# Patient Record
Sex: FEMALE | Race: WHITE | NOT HISPANIC OR LATINO | Employment: FULL TIME | URBAN - METROPOLITAN AREA
[De-identification: names, ages, dates, MRNs, and addresses within clinical notes are randomized per-mention and may not be internally consistent; named-entity substitution may affect disease eponyms.]

---

## 2022-12-08 ENCOUNTER — OFFICE VISIT (OUTPATIENT)
Dept: CARDIOLOGY CLINIC | Facility: CLINIC | Age: 22
End: 2022-12-08

## 2022-12-08 VITALS
WEIGHT: 135.8 LBS | SYSTOLIC BLOOD PRESSURE: 100 MMHG | DIASTOLIC BLOOD PRESSURE: 72 MMHG | HEART RATE: 80 BPM | HEIGHT: 63 IN | BODY MASS INDEX: 24.06 KG/M2

## 2022-12-08 DIAGNOSIS — T78.40XS ALLERGY, SEQUELA: ICD-10-CM

## 2022-12-08 DIAGNOSIS — G90.9 AUTONOMIC DYSFUNCTION: ICD-10-CM

## 2022-12-08 DIAGNOSIS — E86.1 HYPOTENSION DUE TO HYPOVOLEMIA: ICD-10-CM

## 2022-12-08 DIAGNOSIS — R00.2 PALPITATIONS: Primary | ICD-10-CM

## 2022-12-08 DIAGNOSIS — G90.A POTS (POSTURAL ORTHOSTATIC TACHYCARDIA SYNDROME): ICD-10-CM

## 2022-12-08 DIAGNOSIS — I95.89 HYPOTENSION DUE TO HYPOVOLEMIA: ICD-10-CM

## 2022-12-08 RX ORDER — LEVOCETIRIZINE DIHYDROCHLORIDE 5 MG/1
TABLET, FILM COATED ORAL
COMMUNITY

## 2022-12-08 RX ORDER — MIDODRINE HYDROCHLORIDE 2.5 MG/1
2.5 TABLET ORAL 3 TIMES DAILY
Qty: 90 TABLET | Refills: 3 | Status: SHIPPED | OUTPATIENT
Start: 2022-12-08

## 2022-12-08 NOTE — PROGRESS NOTES
EPS Consultation/New Patient Evaluation - Yeimy Alcaraz 25 y o  female MRN: [id number]       Referring:self    CC/HPI:   It was a pleasure to see Hemal Welsh in our arrhythmia clinic at [place] [address]  As you know she is a 25 y o  woman with presumed diagnosis of POTS, significant food and seasonal allergy on daily Xyzal, who presents for evaluation and management of her symptoms  Patient reports having dizziness since age 15 after having suffered multiple sinus infection  She was seen by cardiologist and had normal ECG, and echocardiogram  At the time her brother had been diagnosed with PANDAS syndrome  She continued to have symptoms including low blood pressure to systolic of 70 mmHg  She reportedly has had syncopal episode about 4 years ago  She was evaluated at [facility] in 2020 for symptoms of dizziness, chest palpitation and racing heart rate when standing up  Per the office note, she has been carrying +/- diagnosis of POTS by orthostatic testing and by tilt-testing  She had tried florinef few years before consultation but did not work and caused hormonal problems  Per the office visit note, she had MRIs, EEG, tilt-tests, ECHO,s leep study which had been normal  She was recommended to wear waist high RAYA stocking, increase salt intake, and increase hydration  She has now obtained job in the area as an   She presents today as she had episode of loss of her peripheral vision when standing up along with dizziness, and palpitations  She does wear RAYA stockings though only when doing exercise and it helps her feel better  However she is not wearing them daily as it causes numbness in the legs  She drinks about two 36 oz bottles of water and 2 cups of tea with caffine as well 3 cups of tea without caffeine  She also reports having significant food allergies - including raw vegetables and fruit except lime, and nuts   She is also taking many supplemental vitamins  She has been using Xyzal daily per the request of her immunologist      She also states her glucose level do not regulate well  She had fasting glucose study in the hospital during which she was told her glucose level does not remain stable, and it can be high as well as low  She was advised to eat small frequent meals  Orthostatics performed in the office revealed -  Supine - 96/74 mmHg, heart rate 79  Sitting - 90/62 mmHg, heart rate 82  Standing 100/70, heart rate 115       Past Medical History:  History reviewed  No pertinent past medical history  Medications:      Current Outpatient Medications:   •  levocetirizine (XYZAL) 5 MG tablet, , Disp: , Rfl:      History reviewed  No pertinent family history  Social History     Socioeconomic History   • Marital status: Single     Spouse name: Not on file   • Number of children: Not on file   • Years of education: Not on file   • Highest education level: Not on file   Occupational History   • Not on file   Tobacco Use   • Smoking status: Never   • Smokeless tobacco: Never   Substance and Sexual Activity   • Alcohol use: Yes     Comment: social   • Drug use: Never   • Sexual activity: Not on file   Other Topics Concern   • Not on file   Social History Narrative   • Not on file     Social Determinants of Health     Financial Resource Strain: Not on file   Food Insecurity: Not on file   Transportation Needs: Not on file   Physical Activity: Not on file   Stress: Not on file   Social Connections: Not on file   Intimate Partner Violence: Not on file   Housing Stability: Not on file     Social History     Tobacco Use   Smoking Status Never   Smokeless Tobacco Never     Social History     Substance and Sexual Activity   Alcohol Use Yes    Comment: social       Review of Systems   Constitutional: Negative for chills and fever  HENT: Negative  Eyes: Positive for visual disturbance  Negative for blurred vision and double vision     Cardiovascular: Positive for near-syncope and palpitations  Negative for chest pain, dyspnea on exertion, leg swelling, orthopnea, paroxysmal nocturnal dyspnea and syncope  Respiratory: Negative for cough and sputum production  Endocrine: Negative  Skin: Negative  Negative for rash  Musculoskeletal: Negative  Negative for arthritis and joint pain  Gastrointestinal: Negative for abdominal pain, nausea and vomiting  Genitourinary: Negative  Neurological: Positive for disturbances in coordination (perception/balance issues), dizziness, light-headedness and numbness (in fingers when waking up each mornig)  Psychiatric/Behavioral: Negative  The patient is not nervous/anxious  Allergic/Immunologic: Positive for environmental allergies (and food allergies)  Objective:     Vitals: Blood pressure 100/72, pulse 80, height 5' 3" (1 6 m), weight 61 6 kg (135 lb 12 8 oz)  , Body mass index is 24 06 kg/m²  ,        Physical Exam:    GEN: Jacinda Bailey appears well, alert and oriented x 3, pleasant and cooperative   HEENT: pupils equal, round, and reactive to light; extraocular muscles intact  NECK: supple, no carotid bruits   HEART: regular rhythm, normal S1 and S2, no murmurs, clicks, gallops or rubs   LUNGS: clear to auscultation bilaterally; no wheezes, rales, or rhonchi   ABDOMEN: normal bowel sounds, soft, no tenderness, no distention  EXTREMITIES: peripheral pulses normal; no clubbing, cyanosis, or edema  NEURO: no focal findings   SKIN: normal without suspicious lesions on exposed skin      Labs & Results:  Below is the patient's most recent value for Albumin, ALT, AST, BUN, Calcium, Chloride, Cholesterol, CO2, Creatinine, GFR, Glucose, HDL, Hematocrit, Hemoglobin, Hemoglobin A1C, LDL, Magnesium, Phosphorus, Platelets, Potassium, PSA, Sodium, Triglycerides, and WBC     No results found for: ALT, AST, BUN, CALCIUM, CL, CHOL, CO2, CREATININE, GFRAA, GFRNONAA, HDL, HCT, HGB, HGBA1C, LDL, MG, PHOS, PLT, K, PSA, NA, TRIG, WBC  Note: for a comprehensive list of the patient's lab results, access the Results Review activity  Cardiac testing:     I personally reviewed the ECG performed in the clinic on 12/08/22  It reveals sinus rhythm at 80 bpm      Echocardiograms:  No results found for this or any previous visit  No results found for this or any previous visit  Catheterizations:   No results found for this or any previous visit  Stress Tests:  No results found for this or any previous visit  Holter monitor -   No results found for this or any previous visit  No results found for this or any previous visit  ASSESSMENT/PLAN:  1  POTS  Patient appears to show symptoms of POTS   Tilt-table test record is not is available for review however orthostatic performed in the office are suggestive of POTS  She does benefit from thigh high compression stockings though causes numbness  Advised patient to try knee high compression stocking   Increase hydration with electrolytes, up to 2 L  Avoid caffeine and alcohol  Increase salt intake  Xyzal is known to cause hypotension however patient states it is the only medication working for her numerous food and seasonal allergies  Requested patient to discuss with her immunologist who is in [place]  Will start midodrine 2 5 mg tid after discussing options with immunologist  Obtain BP cuff and daily record BP  Refer to PT for POTS training exercises     2   ?Autonomic disorder  Patient reports having dizziness, low blood pressure after multiple sinus infections  Brother has been diagnosed with PANDAS (Pediatric Autoimmune Neuropsychiatric Disorders Associated with Streptococcal Infections)  Patient reportedly has difficulty regulating glucose and blood pressure  Also notes having perception problem   May have an autonomic disorder  Will refer patient to neurologist for further evaluation     Follow-up in 3 months     I have spent 60 minutes with Patient  today in which greater than 50% of this time was spent in counseling/coordination of care regarding Diagnostic results, Intructions for management, Importance of tx compliance and Impressions

## 2023-01-10 ENCOUNTER — EVALUATION (OUTPATIENT)
Dept: PHYSICAL THERAPY | Facility: CLINIC | Age: 23
End: 2023-01-10

## 2023-01-10 DIAGNOSIS — G90.A POTS (POSTURAL ORTHOSTATIC TACHYCARDIA SYNDROME): ICD-10-CM

## 2023-01-10 DIAGNOSIS — R00.2 PALPITATIONS: ICD-10-CM

## 2023-01-10 DIAGNOSIS — I95.89 HYPOTENSION DUE TO HYPOVOLEMIA: ICD-10-CM

## 2023-01-10 DIAGNOSIS — E86.1 HYPOTENSION DUE TO HYPOVOLEMIA: ICD-10-CM

## 2023-01-10 NOTE — PROGRESS NOTES
PT Evaluation     Today's date: 1/10/2023  Patient name: Lynnette Wu  : 2000  MRN: [id number]  Referring provider: Nacho Adair MD  Dx:   Encounter Diagnosis     ICD-10-CM    1  Palpitations  R00 2 Ambulatory referral to Physical Therapy      2  Hypotension due to hypovolemia  I95 89 Ambulatory referral to Physical Therapy    E86 1       3  POTS (postural orthostatic tachycardia syndrome)  G90  A Ambulatory referral to Physical Therapy                     Assessment  Assessment details: Patient reports to physical therapy with a diagnosis of POTS as well as visual perceptual disorientation  At this time patient has poor tolerance for exercise and knowledge of best exercise for symptoms, decreased balance with significant difficulty without use of visual system for balance causing her to have several instances of falls and difficulty with low light environments and with poor visual references  Patient will benefit from skilled therapy intervention at 2x/week for 8 weeks in order to improve exercise tolerance, reduce POTS symptoms, improve balance, reduce symptoms of dizziness and return patient to increased functional abilities     Understanding of Dx/Px/POC: good   Prognosis: good    Goals  STG:  Pt will be able to stand statically with her eyes closed on the floor for 30 sec within 4 weeks  Pt will be able to exercise without symptoms within 4 weeks  Pt will be independent with home exercise program within 4 weeks    LTG  Pt will be able to self progress through exercise program for POTS within 8 weeks without symptoms  Pt will report low disability index on DHI for balance within 8 weeks  Pt will be independent and consistent with HEP for balance within 8 weeks    Plan  Plan details: Suggestion is twice a week but may only be able to do 1 day per week due to copay as well as schedule conflicts  Patient would benefit from: skilled physical therapy  Planned therapy interventions: neuromuscular re-education, balance, balance/weight bearing training, home exercise program, therapeutic exercise, therapeutic activities and gait training  Frequency: 2x week  Duration in weeks: 8  Plan of Care beginning date: 1/10/2023  Treatment plan discussed with: patient        Subjective Evaluation    History of Present Illness  Mechanism of injury: Pt has a diagnosis of pots which makes it dififcult for her to exercise  Separate from [address] she has visual spatial processing dysfunction which she has had for years  She was in physical therapy previously to improve her proprioceptive training  She reports having difficulty with navigating when not seeing where her feet are, running into objects, and maintaining her balance  She also has hypoglycemia and low blood pressure at times     Exercise history: works out several days a week doing exercises which is mostly kick boxing and supine or staying at some level strengthening exercise          Objective       Co Morbidities:    Syncope:Yes    Current Recommendations:  Compression Socks:Yes  Fluid intake:increasd  Salt intake: increased  Beta Blocker:No    Symptoms with Exercise:  Ligthheaded: Yes  Chest Discomffort: No  Mental clouding: Yes  Headache:Yes  Nausea: Yes  Blurred or Tunnel Vision:Yes    Strength: Grossly 4/5 thorughout    MCTSIB:   Condition 1: 30 sec  Condition 2: 3 sec  Condition 3: 14 sec  Condition 4: not trialed      Starting Protocol for Day One:  Training Mode:    Month: Week:  Warm up: min          RPE:       HR:  Base Pace: min       RPE:       HR:  Recovery: min          RPE:         HR  Base pace:  Min        RPE:        HR:  Cool Down:    Mine   RPE:         Hr:       Precautions: fall risk    Short Term Goal Expiration Date:(2/10/23)  Long Term Goal Expiration Date: (3/10/23)  POC Expiration Date: (3/10/23)            Manuals 1/20                                       Neuro Re-Ed         HT Seated  10x 2       Standing EC 1 finger hold wall - 30 sec Ther Ex                                                                        Ther Activity                        Gait Training                        Modalities

## 2023-01-17 ENCOUNTER — OFFICE VISIT (OUTPATIENT)
Dept: PHYSICAL THERAPY | Facility: CLINIC | Age: 23
End: 2023-01-17

## 2023-01-17 DIAGNOSIS — I95.89 HYPOTENSION DUE TO HYPOVOLEMIA: ICD-10-CM

## 2023-01-17 DIAGNOSIS — E86.1 HYPOTENSION DUE TO HYPOVOLEMIA: ICD-10-CM

## 2023-01-17 DIAGNOSIS — R00.2 PALPITATIONS: Primary | ICD-10-CM

## 2023-01-17 DIAGNOSIS — G90.A POTS (POSTURAL ORTHOSTATIC TACHYCARDIA SYNDROME): ICD-10-CM

## 2023-01-20 NOTE — PROGRESS NOTES
Daily Note     Today's date: 2023  Patient name: Enrique Kelly  : 2000  MRN: [id number]  Referring provider: Deshawn Alex MD  Dx:   Encounter Diagnosis     ICD-10-CM    1  Palpitations  R00 2       2  Hypotension due to hypovolemia  I95 89     E86 1       3  POTS (postural orthostatic tachycardia syndrome)  G90  A                      Subjective: Patient reports no new changes since last session  Has been workin mignon exercises at home      Objective: See treatment diary below      Assessment: Attempted to start on bike then trnsition to treadmill walking, without 2 minutes on treadmill at RPE of 4 pt began having an increase in ches tpain  Attempted completin glonger time on recumbent bike which she was able to tolerate  Discussed with patient that she likely at this time is unable to exert herself to that level in an upright position without increasing her pots symptoms  Will need to work to figure out how to build her up to upright inreased RPE slower  Attempted some strengthening exercises which had similar outcomes  Did have noted improveent with upright seated balance with addition of exeternal cues for proprioceptive awareness of neck in supine position first        Plan: Continue per plan of care        Precautions: fall risk    Short Term Goal Expiration Date:(2/10/23)  Long Term Goal Expiration Date: (3/10/23)  POC Expiration Date: (3/10/23)           Manuals 17                                      Neuro Re-Ed         HT Seated  10x 2       Standing EC 1 finger hold wall - 30 sec        JPE  20x supine    20x seated                                      Ther Ex        10 min total bike  RPE 3-4 no resistance          treadmill  3 min 2 2 mph      bridges  [id number]      downard dog  2x                                      Ther Activity                        Gait Training                        Modalities

## 2023-01-24 ENCOUNTER — OFFICE VISIT (OUTPATIENT)
Dept: PHYSICAL THERAPY | Facility: CLINIC | Age: 23
End: 2023-01-24

## 2023-01-24 DIAGNOSIS — R00.2 PALPITATIONS: Primary | ICD-10-CM

## 2023-01-24 DIAGNOSIS — E86.1 HYPOTENSION DUE TO HYPOVOLEMIA: ICD-10-CM

## 2023-01-24 DIAGNOSIS — G90.A POTS (POSTURAL ORTHOSTATIC TACHYCARDIA SYNDROME): ICD-10-CM

## 2023-01-24 DIAGNOSIS — I95.89 HYPOTENSION DUE TO HYPOVOLEMIA: ICD-10-CM

## 2023-01-24 NOTE — PROGRESS NOTES
Daily Note     Today's date: 2023  Patient name: Elizabeth Templeton  : 2000  MRN: [id number]  Referring provider: Ricarda Soto MD  Dx:   Encounter Diagnosis     ICD-10-CM    1  Palpitations  R00 2       2  Hypotension due to hypovolemia  I95 89     E86 1       3  POTS (postural orthostatic tachycardia syndrome)  G90  A                      Subjective:       Objective: See treatment diary below      Assessment:     Plan: Continue per plan of care        Precautions: fall risk    Short Term Goal Expiration Date:(2/10/23)  Long Term Goal Expiration Date: (3/10/23)  POC Expiration Date: (3/10/23)           Manuals 17                                      Neuro Re-Ed         HT Seated  10x 2       Standing EC 1 finger hold wall - 30 sec        JPE  20x supine    20x seated                                      Ther Ex        10 min total bike  RPE 3-4 no resistance          treadmill  3 min 2 2 mph      bridges  [id number]      downard dog  2x                                      Ther Activity                        Gait Training                        Modalities

## 2023-01-31 ENCOUNTER — OFFICE VISIT (OUTPATIENT)
Dept: PHYSICAL THERAPY | Facility: CLINIC | Age: 23
End: 2023-01-31

## 2023-01-31 DIAGNOSIS — G90.A POTS (POSTURAL ORTHOSTATIC TACHYCARDIA SYNDROME): Primary | ICD-10-CM

## 2023-01-31 DIAGNOSIS — E86.1 HYPOTENSION DUE TO HYPOVOLEMIA: ICD-10-CM

## 2023-01-31 DIAGNOSIS — I95.89 HYPOTENSION DUE TO HYPOVOLEMIA: ICD-10-CM

## 2023-01-31 DIAGNOSIS — R00.2 PALPITATIONS: ICD-10-CM

## 2023-02-01 NOTE — PROGRESS NOTES
Daily Note     Today's date: 2023  Patient name: No Montoya  : 2000  MRN: [id number]  Referring provider: Guerline Pena MD  Dx:   Encounter Diagnosis     ICD-10-CM    1  POTS (postural orthostatic tachycardia syndrome)  G90  A       2  Hypotension due to hypovolemia  I95 89     E86 1       3  Palpitations  R00 2                      Subjective: Patient reports to physical therapy this session stating that she hasn't been able to work out as much as she wanted  Attempted using an upright bike which caused her to have more symptoms than she had on a recumbent bike  Objective: See treatment diary below      Assessment: Therapist encouraged pt to attempt to find a recumbent bike to use and then if she was unable to return to the upright bike and continue as she is able to consistently  Focused this session more on visual spatial awareness with attempts to reweight to somatosensory system for feedback  Utilized weight shifting, JPE for cervical spine  Pt was able to hold balance with eyes closed on the floor  Plan: Continue per plan of care        Precautions: fall risk    Short Term Goal Expiration Date:(2/10/23)  Long Term Goal Expiration Date: (3/10/23)  POC Expiration Date: (3/10/23)           Manuals 17                                      Neuro Re-Ed         HT Seated  10x 2       Standing EC 1 finger hold wall - 30 sec        JPE  20x supine    20x seated                                      Ther Ex        10 min total bike  RPE 3-4 no resistance          treadmill  3 min 2 2 mph      bridges  [id number]      downard dog  2x                                      Ther Activity                        Gait Training                        Modalities

## 2023-02-07 ENCOUNTER — OFFICE VISIT (OUTPATIENT)
Dept: PHYSICAL THERAPY | Facility: CLINIC | Age: 23
End: 2023-02-07

## 2023-02-07 DIAGNOSIS — I95.89 HYPOTENSION DUE TO HYPOVOLEMIA: ICD-10-CM

## 2023-02-07 DIAGNOSIS — E86.1 HYPOTENSION DUE TO HYPOVOLEMIA: ICD-10-CM

## 2023-02-07 DIAGNOSIS — G90.A POTS (POSTURAL ORTHOSTATIC TACHYCARDIA SYNDROME): Primary | ICD-10-CM

## 2023-02-10 NOTE — PROGRESS NOTES
Daily Note     Today's date: 2/10/2023  Patient name: Sujata Rod  : 2000  MRN: [id number]  Referring provider: Nataly Cates MD  Dx:   Encounter Diagnosis     ICD-10-CM    1  POTS (postural orthostatic tachycardia syndrome)  G90  A       2  Hypotension due to hypovolemia  I95 89     E86 1                      Subjective:       Objective: See treatment diary below      Assessment:       Plan: Continue per plan of care        Precautions: fall risk    Short Term Goal Expiration Date:(2/10/23)  Long Term Goal Expiration Date: (3/10/23)  POC Expiration Date: (3/10/23)           Manuals 17                                      Neuro Re-Ed         HT Seated  10x 2       Standing EC 1 finger hold wall - 30 sec        JPE  20x supine    20x seated                                      Ther Ex        10 min total bike  RPE 3-4 no resistance          treadmill  3 min 2 2 mph      bridges  [id number]      downard dog  2x                                      Ther Activity                        Gait Training                        Modalities

## 2023-02-14 ENCOUNTER — OFFICE VISIT (OUTPATIENT)
Dept: PHYSICAL THERAPY | Facility: CLINIC | Age: 23
End: 2023-02-14

## 2023-02-14 DIAGNOSIS — E86.1 HYPOTENSION DUE TO HYPOVOLEMIA: ICD-10-CM

## 2023-02-14 DIAGNOSIS — I95.89 HYPOTENSION DUE TO HYPOVOLEMIA: ICD-10-CM

## 2023-02-14 DIAGNOSIS — G90.A POTS (POSTURAL ORTHOSTATIC TACHYCARDIA SYNDROME): Primary | ICD-10-CM

## 2023-02-14 DIAGNOSIS — R00.2 PALPITATIONS: ICD-10-CM

## 2023-02-16 NOTE — PROGRESS NOTES
Daily Note     Today's date: 2023  Patient name: Eva Coombs  : 2000  MRN: [id number]  Referring provider: Rickie Tyson MD  Dx:   Encounter Diagnosis     ICD-10-CM    1  POTS (postural orthostatic tachycardia syndrome)  G90  A       2  Hypotension due to hypovolemia  I95 89     E86 1       3  Palpitations  R00 2                      Subjective: Pt did several workouts this week and did have fewer symptoms overall  Objective: See treatment diary below      Assessment: patient was able to complete eyes close don pball well this session after repetitions of practice it did improve  She also was able to complete tandem walking fo rsometimes 3 steps without light touch on //bars  Plan to continue to practice again at next session  Plan: Continue per plan of care        Precautions: fall risk    Short Term Goal Expiration Date:(2/10/23)  Long Term Goal Expiration Date: (3/10/23)  POC Expiration Date: (3/10/23)           Manuals                                      Neuro Re-Ed         HT Seated  10x 2       Standing EC 1 finger hold wall - 30 sec        JPE  20x supine    20x seated      Seated march   pball - 40x    EC - 5 sets of 10     Bird dog    EC 10x     Tandem wal   3 laps EO    5 laps EC min UE              Ther Ex        10 min total bike  RPE 3-4 no resistance          treadmill  3 min 2 2 mph      bridges  [id number]      downard dog  2x                                      Ther Activity                        Gait Training                        Modalities

## 2023-02-21 ENCOUNTER — OFFICE VISIT (OUTPATIENT)
Dept: PHYSICAL THERAPY | Facility: CLINIC | Age: 23
End: 2023-02-21

## 2023-02-21 DIAGNOSIS — G90.A POTS (POSTURAL ORTHOSTATIC TACHYCARDIA SYNDROME): Primary | ICD-10-CM

## 2023-02-21 DIAGNOSIS — I95.89 HYPOTENSION DUE TO HYPOVOLEMIA: ICD-10-CM

## 2023-02-21 DIAGNOSIS — R00.2 PALPITATIONS: ICD-10-CM

## 2023-02-21 DIAGNOSIS — E86.1 HYPOTENSION DUE TO HYPOVOLEMIA: ICD-10-CM

## 2023-02-24 NOTE — PROGRESS NOTES
Daily Note     Today's date: 2023  Patient name: Jacinda Bailey  : 2000  MRN: [id number]  Referring provider: Jemima Barber MD  Dx:   Encounter Diagnosis     ICD-10-CM    1  POTS (postural orthostatic tachycardia syndrome)  G90  A       2  Hypotension due to hypovolemia  I95 89     E86 1       3  Palpitations  R00 2                      Subjective: Did several workouts this week and is getting better at not triggering feelings  Overall noticing she can bending and grab something form the floor and have fewer symptoms  Objective: See treatment diary below      Assessment: Pt was able to do sveeral reptitions of walking with eyes closed  Attempted utilization of a wegihted vest to increase sensory input  Pt overall significantly improving with maintaining balance with resistance  Plan: Continue per plan of care        Precautions: fall risk    Short Term Goal Expiration Date:(2/10/23)  Long Term Goal Expiration Date: (3/10/23)  POC Expiration Date: (3/10/23)           Manuals                                     Neuro Re-Ed         HT Seated  10x 2       Standing EC 1 finger hold wall - 30 sec    30 sec x 5    Fwd walking 5laps ec    JPE  20x supine    20x seated      Seated march   pball - 40x    EC - 5 sets of 10     Bird dog    EC 10x     Tandem wal   3 laps EO    5 laps EC min UE  3 laps //bars with weighted vest            Ther Ex        10 min total bike  RPE 3-4 no resistance      10 min    treadmill  3 min 2 2 mph      bridges  [id number]      downard dog  2x                                      Ther Activity                        Gait Training                        Modalities

## 2023-02-28 ENCOUNTER — OFFICE VISIT (OUTPATIENT)
Dept: PHYSICAL THERAPY | Facility: CLINIC | Age: 23
End: 2023-02-28

## 2023-02-28 DIAGNOSIS — E86.1 HYPOTENSION DUE TO HYPOVOLEMIA: ICD-10-CM

## 2023-02-28 DIAGNOSIS — R00.2 PALPITATIONS: ICD-10-CM

## 2023-02-28 DIAGNOSIS — G90.A POTS (POSTURAL ORTHOSTATIC TACHYCARDIA SYNDROME): Primary | ICD-10-CM

## 2023-02-28 DIAGNOSIS — I95.89 HYPOTENSION DUE TO HYPOVOLEMIA: ICD-10-CM

## 2023-03-01 NOTE — PROGRESS NOTES
Daily Note     Today's date: 3/1/2023  Patient name: Kiarra Miller  : 2000  MRN: [id number]  Referring provider: Mami Sepulveda MD  Dx:   Encounter Diagnosis     ICD-10-CM    1  POTS (postural orthostatic tachycardia syndrome)  G90  A       2  Hypotension due to hypovolemia  I95 89     E86 1       3  Palpitations  R00 2                      Subjective: Patient reports doing several day sof exercise but feels she over did it with bikig one day and then felt poorly the next day with pots symptoms  Objective: See treatment diary below      Assessment:   Patient was bale to walk down the hallway without physical assist and without UE assist with her eyes closed  She required a few physical cues to redirect her path but she did not fall  She required time to reorient while being monitored but then could return  Plan: Continue per plan of care        Precautions: fall risk    Short Term Goal Expiration Date:(2/10/23)  Long Term Goal Expiration Date: (3/10/23)  POC Expiration Date: (3/10/23)           Manuals                                    Neuro Re-Ed         HT Seated  10x 2       Standing EC 1 finger hold wall - 30 sec    30 sec x 5    Fwd walking 5laps ec Walking hallway ec - 40 ft x 2    Walking halway ec with finger tip on wall - 40 ft x 2   JPE  20x supine    20x seated      Seated march   pball - 40x    EC - 5 sets of 10     Bird dog    EC 10x     Tandem wal   3 laps EO    5 laps EC min UE  3 laps //bars with weighted vest Ht/hn 4 laps //bars   [employer] without looking at fet up and over stairs - 15x   Ther Ex        10 min total bike  RPE 3-4 no resistance      10 min    treadmill  3 min 2 2 mph      bridges  [id number]      downard dog  2x                                      Ther Activity                        Gait Training                        Modalities

## 2023-03-07 ENCOUNTER — APPOINTMENT (OUTPATIENT)
Dept: PHYSICAL THERAPY | Facility: CLINIC | Age: 23
End: 2023-03-07

## 2023-03-07 ENCOUNTER — OFFICE VISIT (OUTPATIENT)
Dept: PHYSICAL THERAPY | Facility: CLINIC | Age: 23
End: 2023-03-07

## 2023-03-07 DIAGNOSIS — R00.2 PALPITATIONS: ICD-10-CM

## 2023-03-07 DIAGNOSIS — G90.A POTS (POSTURAL ORTHOSTATIC TACHYCARDIA SYNDROME): Primary | ICD-10-CM

## 2023-03-07 DIAGNOSIS — I95.89 HYPOTENSION DUE TO HYPOVOLEMIA: ICD-10-CM

## 2023-03-07 DIAGNOSIS — E86.1 HYPOTENSION DUE TO HYPOVOLEMIA: ICD-10-CM

## 2023-03-07 NOTE — PROGRESS NOTES
PT Re-Evaluation     Today's date: 3/7/2023  Patient name: Fletcher Schwartz  : 2000  MRN: [id number]  Referring provider: Ricki Crump MD  Dx:   Encounter Diagnosis     ICD-10-CM    1  POTS (postural orthostatic tachycardia syndrome)  G90  A       2  Hypotension due to hypovolemia  I95 89     E86 1       3  Palpitations  R00 2                      Assessment/Plan  Assessment  Pt presents to OP PT for PN/RE following 8 visits of skilled therapy intervention  During this time pt has completed interventions targeting cardiovascular endurance, balance, visual spatial and kinesthetic awareness  Per pt report, she is now able to better /moderate her activity level to avoid POTS symptoms and is exercising more  Still notes difficulty w/ spatial and kinesthetic awareness  Upon formal re-assessment pt demonstrates significant improvements in mCTSIB Conditions 2 &3 with varying levels of postural sway; unable to maintain Condition 4 for >2 sec and severe postural sway observed  Continues w/ difficulty w/ proprioceptive awareness when given dual task  Given these findings pt is recommended for continued skilled therapy intervention 1-2x week  Pt agreeable to POC         Goals  STG:  Pt will be able to stand statically with her eyes closed on the floor for 30 sec within 4 weeks  MET  Pt will be able to exercise without symptoms within 4 weeks  ONGOING  Pt will be independent with home exercise program within 4 weeks  MET    LTG  Pt will be able to self progress through exercise program for POTS within 8 weeks without symptoms  ONGOING  Pt will report low disability index on DHI for balance within 8 weeks  ONGOING  Pt will be independent and consistent with HEP for balance within 8 weeks  ONGOING    Plan  Plan details: Suggestion is twice a week but may only be able to do 1 day per week due to copay as well as schedule conflicts  Patient would benefit from: skilled physical therapy  Planned therapy interventions: neuromuscular re-education, balance, balance/weight bearing training, home exercise program, therapeutic exercise, therapeutic activities and gait training  Frequency: 2x week  Duration in weeks: 8  Treatment plan discussed with: patient    Subjective    Pt reports improvements overall since initiating therapy  States that she is better at moderating amount of activity to avoid flare of symptoms  She is also better at knowing how to correct her balance and recognizing what might be difficult  She still has difficulty w/ proprioceptive tasks and visual spatial awareness       Objective     Strength: Grossly 4/5 thorughout    MCTSIB Number of Seconds (IE) Number of Seconds (RE 3/7)   Feet Together, Eyes Open 30 30   Feet Together, Eyes Closed 3 30 (max sway)   Feet Together, Eyes Open Foam 14 30 (min-mod sway)   Feet Together, Eyes Closed Foam NT 2      DHI: 56/100 (severe handicap)       Precautions: fall risk    Short Term Goal Expiration Date:(2/10/23)  Long Term Goal Expiration Date: (3/10/23)  POC Expiration Date: (5/2/23)           Manuals 3/7 1/17 2/14 2/24 2/28                                   Neuro Re-Ed         HT Seated         Standing EC    30 sec x 5    Fwd walking 5laps ec Walking hallway ec - 40 ft x 2    Walking halway ec with finger tip on wall - 40 ft x 2   JPE  20x supine    20x seated      Seated march   pball - 40x    EC - 5 sets of 10     Bird dog    EC 10x     Tandem walk EO x2 laps // bars   EC x2 laps // bars  3 laps EO    5 laps EC min UE  3 laps //bars with weighted vest Ht/hn 4 laps //bars   Stand on Bosu w/ alt UE lift x30       Bosu lunges w/ HT/HN x20 ea (alt  LE)       Standing on foam kicking soccer ball 2x30       Laundry basket Walking down all w/ HT and avoiding objects on ground x3 laps    Carry without looking at fet up and over stairs - 15x   Ther Ex        10 min total bike 10 min  RPE 3-4 no resistance      10 min    treadmill  3 min 2 2 mph      bridges [id number]      downard dog  2x      RE VR                               Ther Activity                        Gait Training                        Modalities

## 2023-03-14 ENCOUNTER — OFFICE VISIT (OUTPATIENT)
Dept: PHYSICAL THERAPY | Facility: CLINIC | Age: 23
End: 2023-03-14

## 2023-03-14 DIAGNOSIS — R42 DIZZINESS: ICD-10-CM

## 2023-03-14 DIAGNOSIS — Z71.82 EXERCISE COUNSELING: ICD-10-CM

## 2023-03-14 DIAGNOSIS — I95.89 HYPOTENSION DUE TO HYPOVOLEMIA: ICD-10-CM

## 2023-03-14 DIAGNOSIS — G90.A POTS (POSTURAL ORTHOSTATIC TACHYCARDIA SYNDROME): Primary | ICD-10-CM

## 2023-03-14 DIAGNOSIS — E86.1 HYPOTENSION DUE TO HYPOVOLEMIA: ICD-10-CM

## 2023-03-17 NOTE — PROGRESS NOTES
Daily Note     Today's date: 3/17/2023  Patient name: Kya Lizarraga  : 2000  MRN: [id number]  Referring provider: All Sawant MD  Dx:   Encounter Diagnosis     ICD-10-CM    1  POTS (postural orthostatic tachycardia syndrome)  G90  A       2  Hypotension due to hypovolemia  I95 89     E86 1       3  Exercise counseling  Z71 82       4  Dizziness  R42                      Subjective: Patient reports not doing exercises this week and overall having more POTS symptoms this week compared to how she has been feeling previously  Objective: See treatment diary below      Assessment: Pt was able to complete lateral weight shifting with eyes closed for 2-3 reps before needing to use UE to regain balance  Plan: Continue per plan of care        Precautions: fall risk    Short Term Goal Expiration Date:(2/10/23)  Long Term Goal Expiration Date: (3/10/23)  POC Expiration Date: (23)           Manuals 3/7 3/14   2/28                                   Neuro Re-Ed         Rocker board  10x fwd/lat EC attempted ea      Standing EC     Walking hallway ec - 40 ft x 2    Walking halway ec with finger tip on wall - 40 ft x 2   JPE        Seated march        Bird dog   10x HT EC      Tandem walk EO x2 laps // bars   EC x2 laps // bars    Ht/hn 4 laps //bars   Stand on Abran Schein w/ alt UE lift x30       Bosu lunges w/ HT/HN x20 ea (alt  LE)       Standing on foam kicking soccer ball 2x30       Laundry basket Walking down all w/ HT and avoiding objects on ground x3 laps    Carry without looking at fet up and over stairs - 15x   Ther Ex        10 min total bike 10 min        treadmill        bridges        downard dog        RE VR                               Ther Activity                        Gait Training                        Modalities

## 2023-03-21 ENCOUNTER — OFFICE VISIT (OUTPATIENT)
Dept: PHYSICAL THERAPY | Facility: CLINIC | Age: 23
End: 2023-03-21

## 2023-03-21 DIAGNOSIS — G90.A POTS (POSTURAL ORTHOSTATIC TACHYCARDIA SYNDROME): Primary | ICD-10-CM

## 2023-03-21 DIAGNOSIS — Z71.82 EXERCISE COUNSELING: ICD-10-CM

## 2023-03-24 NOTE — PROGRESS NOTES
Daily Note     Today's date: 3/24/2023  Patient name: Silas Stewart  : 2000  MRN: [id number]  Referring provider: Loren Hines MD  Dx:   Encounter Diagnosis     ICD-10-CM    1  POTS (postural orthostatic tachycardia syndrome)  G90  A       2  Exercise counseling  Z71 82                      Subjective: patient reports to physical therapy this date with reports of increased POTS symptoms this week and having difficulty completing exercises at home due to symptoms      Objective: See treatment diary below      Assessment: Trialed occluders for blocking vision of feet b/l  Started with small balance challenge and added more challenge  She improved with practice  Also added random attempts to step over objects and respond which she was able to do without loss of balance  Plan: Continue per plan of care        Precautions: fall risk    Short Term Goal Expiration Date:(2/10/23)  Long Term Goal Expiration Date: (3/10/23)  POC Expiration Date: (23)           Manuals 3/7 3/14 3/21  2/28                                   Neuro Re-Ed         Rocker board  10x fwd/lat EC attempted ea      Standing EC     Walking hallway ec - 40 ft x 2    Walking halway ec with finger tip on wall - 40 ft x 2   JPE        Seated march        Bird dog   10x HT EC      Tandem walk EO x2 laps // bars   EC x2 laps // bars    Ht/hn 4 laps //bars   Stand on Abran Schein w/ alt UE lift x30       Bosu lunges w/ HT/HN x20 ea (alt  LE)       Standing on foam kicking soccer ball 2x30       Laundry basket Walking down all w/ HT and avoiding objects on ground x3 laps  With occluders - 4 laps hallway     With occluders and one pool noodle - 4 laps  Carry without looking at fet up and over stairs - 15x   EC   Walking with 1 pool noodle suport - 2 laps    No pool noodle - 2 laps     Turn and grab   Scarves with EC - 30x with random cog tasks                             Ther Ex        10 min total bike 10 min        treadmill        bridges jesse dog        RE VR                               Ther Activity                        Gait Training                        Modalities

## 2023-03-28 ENCOUNTER — OFFICE VISIT (OUTPATIENT)
Dept: PHYSICAL THERAPY | Facility: CLINIC | Age: 23
End: 2023-03-28

## 2023-03-28 DIAGNOSIS — R00.2 PALPITATIONS: ICD-10-CM

## 2023-03-28 DIAGNOSIS — G90.A POTS (POSTURAL ORTHOSTATIC TACHYCARDIA SYNDROME): Primary | ICD-10-CM

## 2023-03-28 DIAGNOSIS — Z71.82 EXERCISE COUNSELING: ICD-10-CM

## 2023-03-28 DIAGNOSIS — I95.89 HYPOTENSION DUE TO HYPOVOLEMIA: ICD-10-CM

## 2023-03-28 DIAGNOSIS — E86.1 HYPOTENSION DUE TO HYPOVOLEMIA: ICD-10-CM

## 2023-03-29 ENCOUNTER — APPOINTMENT (OUTPATIENT)
Dept: PHYSICAL THERAPY | Facility: CLINIC | Age: 23
End: 2023-03-29

## 2023-03-30 NOTE — PROGRESS NOTES
Daily Note     Today's date: 3/30/2023  Patient name: Tha Meraz  : 2000  MRN: [id number]  Referring provider: Sol Ruggiero MD  Dx:   Encounter Diagnosis     ICD-10-CM    1  POTS (postural orthostatic tachycardia syndrome)  G90  A       2  Exercise counseling  Z71 82       3  Hypotension due to hypovolemia  I95 89     E86 1       4  Palpitations  R00 2                      Subjective:Fell off of the couch and hit her head last week and was diagnosed with a concussion  Had 2 days off of work and then returned one day virtually and two days in person  Feeling significant headache, significantly fatigued and foggy by 6th hour of work  No loss of consciousness, weakness, loss of sensation, neck pain  Some dizziness reported  NO vomitting or nausea  Does report some increase in symptoms with exertion outside of normal symptoms relating to POTS  Objective: See treatment diary below      Assessment: THerpaist suggested seeing PCP for note to reduce work day for 1-2 weeks, suggested returnign to recumbent bike to exercise without increasing symptoms  Assured pt symptoms will improve and typical recovery time is about 2 weeks and she is still well within that window of time  Hel don activity this date due to significant headache currently at 7/10  Plan: Continue per plan of care        Precautions: fall risk    Short Term Goal Expiration Date:(2/10/23)  Long Term Goal Expiration Date: (3/10/23)  POC Expiration Date: (23)           Manuals 3/7 3/14 3/21  2/28                                   Neuro Re-Ed         Rocker board  10x fwd/lat EC attempted ea      Standing EC     Walking hallway ec - 40 ft x 2    Walking halway ec with finger tip on wall - 40 ft x 2   JPE        Seated march        Bird dog   10x HT EC      Tandem walk EO x2 laps // bars   EC x2 laps // bars    Ht/hn 4 laps //bars   Stand on Abran Terrazas w/ alt UE lift x30       Bosu lunges w/ HT/HN x20 ea (alt  LE)       Standing on foam kicking soccer ball 2x30       Laundry basket Walking down all w/ HT and avoiding objects on ground x3 laps  With occluders - 4 laps hallway     With occluders and one pool noodle - 4 laps  Carry without looking at fet up and over stairs - 15x   EC   Walking with 1 pool noodle suport - 2 laps    No pool noodle - 2 laps     Turn and grab   Scarves with EC - 30x with random cog tasks                             Ther Ex        10 min total bike 10 min        treadmill        bridges        downard dog        RE VR                               Ther Activity                        Gait Training                        Modalities

## 2023-04-04 ENCOUNTER — OFFICE VISIT (OUTPATIENT)
Dept: PHYSICAL THERAPY | Facility: CLINIC | Age: 23
End: 2023-04-04

## 2023-04-04 DIAGNOSIS — R42 DIZZINESS: ICD-10-CM

## 2023-04-04 DIAGNOSIS — G90.A POTS (POSTURAL ORTHOSTATIC TACHYCARDIA SYNDROME): Primary | ICD-10-CM

## 2023-04-04 DIAGNOSIS — Z71.82 EXERCISE COUNSELING: ICD-10-CM

## 2023-04-05 ENCOUNTER — APPOINTMENT (OUTPATIENT)
Dept: PHYSICAL THERAPY | Facility: CLINIC | Age: 23
End: 2023-04-05

## 2023-04-07 NOTE — PROGRESS NOTES
Daily Note     Today's date: 2023  Patient name: Yemi Pozo  : 2000  MRN: [id number]  Referring provider: Marty Brar MD  Dx:   Encounter Diagnosis     ICD-10-CM    1  POTS (postural orthostatic tachycardia syndrome)  G90  A       2  Dizziness  R42       3  Exercise counseling  Z71 82                      Subjective: Patient reports feeling significantly btter but does have a bit of a headache today and also has been a bit more symptomatic with exercise this week  Objective: See treatment diary below      Assessment: Patient was able to tolerate biking this date but with significant increase in symptoms when exerting herself more than a 4/10 RPE  Frequent need to slow down to decrease symptoms  Plan: Continue per plan of care        Precautions: fall risk    Short Term Goal Expiration Date:(2/10/23)  Long Term Goal Expiration Date: (3/10/23)  POC Expiration Date: (23)           Manuals 3/7 3/14 3/21 4/4                                    Neuro Re-Ed         Rocker board  10x fwd/lat EC attempted ea      Standing EC        JPE        Seated march        Bird dog   10x HT EC      Tandem walk EO x2 laps // bars   EC x2 laps // bars       Stand on Abran Schein w/ alt UE lift x30       Bosu lunges w/ HT/HN x20 ea (alt  LE)       Standing on foam kicking soccer ball 2x30       Laundry basket Walking down all w/ HT and avoiding objects on ground x3 laps  With occluders - 4 laps hallway     With occluders and one pool noodle - 4 laps     EC   Walking with 1 pool noodle suport - 2 laps    No pool noodle - 2 laps     Turn and grab   Scarves with EC - 30x with random cog tasks                             Ther Ex        10 min total bike 10 min    25 min lvl 1 0 varying levels of speed and intensity based on symptoms - therapist monitorig and instructing throughout    treadmill        bridges        downard dog        RE VR                               Ther Activity Gait Training                        Modalities

## 2023-04-12 ENCOUNTER — APPOINTMENT (OUTPATIENT)
Dept: PHYSICAL THERAPY | Facility: CLINIC | Age: 23
End: 2023-04-12

## 2023-04-14 NOTE — PATIENT INSTRUCTIONS
Change your water into some other drinks with electrolytes such as Gatorade    Try to decrease amount of caffeine and alcohol    Wear the RAYA stocking daily - find ones that up to the knees    Start midodrine 2 5 mg three time a day    Check blood pressure daily and keep a log     Find a primary care doctor and speak to your immunologist regarding switching Xyzal as it has side effect that can cause low blood pressure    Make an appointment with neurologist     Please see physical therapy [Yes] : Yes [No falls in past year] : Patient reported no falls in the past year [0] : 2) Feeling down, depressed, or hopeless: Not at all (0) [Never] : Never [PHQ-2 Negative - No further assessment needed] : PHQ-2 Negative - No further assessment needed [[id number]] : 0

## 2023-04-19 ENCOUNTER — APPOINTMENT (OUTPATIENT)
Dept: PHYSICAL THERAPY | Facility: CLINIC | Age: 23
End: 2023-04-19

## 2023-04-25 ENCOUNTER — OFFICE VISIT (OUTPATIENT)
Dept: PHYSICAL THERAPY | Facility: CLINIC | Age: 23
End: 2023-04-25

## 2023-04-25 DIAGNOSIS — G90.A POTS (POSTURAL ORTHOSTATIC TACHYCARDIA SYNDROME): ICD-10-CM

## 2023-04-25 DIAGNOSIS — R42 DIZZINESS: Primary | ICD-10-CM

## 2023-04-26 ENCOUNTER — OFFICE VISIT (OUTPATIENT)
Dept: FAMILY MEDICINE CLINIC | Facility: CLINIC | Age: 23
End: 2023-04-26

## 2023-04-26 VITALS
HEIGHT: 63 IN | WEIGHT: 130.8 LBS | DIASTOLIC BLOOD PRESSURE: 68 MMHG | BODY MASS INDEX: 23.18 KG/M2 | HEART RATE: 97 BPM | SYSTOLIC BLOOD PRESSURE: 108 MMHG | OXYGEN SATURATION: 97 % | TEMPERATURE: 99 F

## 2023-04-26 DIAGNOSIS — Z00.00 ANNUAL PHYSICAL EXAM: Primary | ICD-10-CM

## 2023-04-26 PROBLEM — E16.1 REACTIVE HYPOGLYCEMIA: Status: ACTIVE | Noted: 2023-04-26

## 2023-04-26 PROBLEM — E28.2 PCOS (POLYCYSTIC OVARIAN SYNDROME): Status: ACTIVE | Noted: 2023-04-26

## 2023-04-26 PROBLEM — G90.A POTS (POSTURAL ORTHOSTATIC TACHYCARDIA SYNDROME): Status: ACTIVE | Noted: 2023-04-26

## 2023-04-26 RX ORDER — ONDANSETRON 4 MG/1
TABLET, FILM COATED ORAL
COMMUNITY

## 2023-04-26 RX ORDER — LEVOCETIRIZINE DIHYDROCHLORIDE 5 MG/1
TABLET, FILM COATED ORAL
COMMUNITY

## 2023-04-27 NOTE — PROGRESS NOTES
Daily Note     Today's date: 2023  Patient name: Ricadro Calderon  : 2000  MRN: [id number]  Referring provider: Breann Malcolm MD  Dx:   Encounter Diagnosis     ICD-10-CM    1  Dizziness  R42       2  POTS (postural orthostatic tachycardia syndrome)  G90  A                      Subjective: Patient reports exercising but overall having an increase in POTS type symptoms in the last week  May be due to monthly hormonal cycles change      Objective: See treatment diary below      Assessment: Focused on supine strenghtneing program since patient overall was not feeling well this date and has not been reviewed in awhile  Pt was able to tolerate with only minimal increase in symptoms this date  Plan next session to discuss d/c        Plan: Continue per plan of care        Precautions: fall risk    Short Term Goal Expiration Date:(2/10/23)  Long Term Goal Expiration Date: (3/10/23)  POC Expiration Date: (23)           Manuals                                    Neuro Re-Ed         Rocker board        Standing EC        JPE        Seated march        Bird dog  [id number]       Tandem walk        Stand on Abran Schein w/ alt UE lift        Bosu lunges w/ HT/HN        Standing on foam kicking soccer ball        Laundry basket        EC        Turn and grab        virtualis     Eye head coordination    VOR container    VOR                   Ther Ex        10 min total bike    25 min lvl 1 0 varying levels of speed and intensity based on symptoms - therapist monitorig and instructing throughout    treadmill        bridges [id number]       plank 15 sec x 5               S/l hi abd 10x2 ea       SLR 10x2       clamshell gtb 10x 2       Ther Activity                        Gait Training                        Modalities

## 2023-04-28 NOTE — PROGRESS NOTES
Assessment/Plan:    71-year-old woman with: Annual well visit discussed very safety and health maintenance issues including healthy diet like Mediterranean diet exercise healthy weight as tolerated ample sleep stress reduction strategies discussed working return parameters at length will refer for GYN for annual exam follow-up yearly and as needed follow-up    No problem-specific Assessment & Plan notes found for this encounter  Diagnoses and all orders for this visit:    Annual physical exam  -     Ambulatory Referral to Obstetrics / Gynecology; Future    Other orders  -     levocetirizine (XYZAL) 5 MG tablet;  (Patient not taking: Reported on 4/26/2023)  -     ondansetron (ZOFRAN) 4 mg tablet; 1 tablet on the tongue and allow to dissolve (Patient not taking: Reported on 4/26/2023)          Subjective:     Chief Complaint   Patient presents with   • New Patient Visit     Establish care   • Well Check     Annual physical        Patient ID: Ezekiel Mendoza is a 21 y o  female  Patient is a 71-year-old woman who presents to establish care in this practice  She admits that she has a longstanding history of several conditions including POTS syndrome and some balance and coordination issues  She continues to follow with physical therapy she admits she is doing very well on she is also here for an annual well visit she admits being physically active generally to healthy diet she sleeps well no other health maintenance issues      The following portions of the patient's history were reviewed and updated as appropriate: allergies, current medications, past family history, past medical history, past social history, past surgical history and problem list     Review of Systems   Constitutional: Negative  HENT: Negative  Eyes: Negative  Respiratory: Negative  Cardiovascular: Negative  Gastrointestinal: Negative  Endocrine: Negative  Genitourinary: Negative  Musculoskeletal: Negative  "  Allergic/Immunologic: Negative  Neurological: Positive for dizziness  Hematological: Negative  Psychiatric/Behavioral: Negative  All other systems reviewed and are negative  Objective:      /68 (BP Location: Right arm, Patient Position: Sitting, Cuff Size: Standard)   Pulse 97   Temp 99 °F (37 2 °C) (Tympanic)   Ht 5' 3\" (1 6 m)   Wt 59 3 kg (130 lb 12 8 oz)   SpO2 97%   BMI 23 17 kg/m²          Physical Exam  Constitutional:       Appearance: She is well-developed  HENT:      Head: Atraumatic  Right Ear: External ear normal       Left Ear: External ear normal    Eyes:      Conjunctiva/sclera: Conjunctivae normal       Pupils: Pupils are equal, round, and reactive to light  Cardiovascular:      Rate and Rhythm: Normal rate and regular rhythm  Heart sounds: Normal heart sounds  Pulmonary:      Effort: Pulmonary effort is normal  No respiratory distress  Breath sounds: Normal breath sounds  Abdominal:      General: There is no distension  Palpations: Abdomen is soft  Tenderness: There is no abdominal tenderness  There is no guarding or rebound  Musculoskeletal:         General: Normal range of motion  Cervical back: Normal range of motion  Skin:     General: Skin is warm and dry  Neurological:      Mental Status: She is alert and oriented to person, place, and time  Cranial Nerves: No cranial nerve deficit  Psychiatric:         Behavior: Behavior normal          Thought Content:  Thought content normal          Judgment: Judgment normal          "

## 2023-05-02 ENCOUNTER — OFFICE VISIT (OUTPATIENT)
Dept: PHYSICAL THERAPY | Facility: CLINIC | Age: 23
End: 2023-05-02

## 2023-05-02 DIAGNOSIS — R42 DIZZINESS: Primary | ICD-10-CM

## 2023-05-02 DIAGNOSIS — G90.A POTS (POSTURAL ORTHOSTATIC TACHYCARDIA SYNDROME): ICD-10-CM

## 2023-05-05 NOTE — PROGRESS NOTES
Daily Note     Today's date: 2023  Patient name: Suri Doshi  : 2000  MRN: [id number]  Referring provider: Herminio Virk MD  Dx:   Encounter Diagnosis     ICD-10-CM    1  Dizziness  R42       2  POTS (postural orthostatic tachycardia syndrome)  G90  A                      Subjective: Patient reports to therapy this date with reports of continued increase in POTs symptoms as well as not sleeping well at night due to allergies and sinus congestion      Objective: See treatment diary below      Assessment: Attempted dual task while standing with eyes closed with music which she strugled to stay on beat but did hold uprigh tposture       Plan: Continue per plan of care        Precautions: fall risk    Short Term Goal Expiration Date:(2/10/23)  Long Term Goal Expiration Date: (3/10/23)  POC Expiration Date: (23)           Manuals                                    Neuro Re-Ed         Rocker board        Standing EC  While alternating hitting foot and hand to beat of music - 3 songs      JPE        Seated march        Bird dog  [id number]       Tandem walk        Stand on Abran Schein w/ alt UE lift        Bosralph lunges w/ HT/HN        Standing on foam kicking soccer ball        Laundry basket        EC        Turn and grab        virtualis     Eye head coordination    VOR container    VOR   Walking EC  Holding pool noodles - 5 laps with increasing number of steps laternating eyes open and eyes closed              Ther Ex        10 min total bike    25 min lvl 1 0 varying levels of speed and intensity based on symptoms - therapist monitorig and instructing throughout    treadmill        bridges [id number]       plank 15 sec x 5               S/l hi abd 10x2 ea       SLR 10x2       clamshell gtb 10x 2       Ther Activity                        Gait Training                        Modalities

## 2023-05-09 ENCOUNTER — OFFICE VISIT (OUTPATIENT)
Dept: PHYSICAL THERAPY | Facility: CLINIC | Age: 23
End: 2023-05-09

## 2023-05-09 DIAGNOSIS — R42 DIZZINESS: Primary | ICD-10-CM

## 2023-05-09 DIAGNOSIS — R00.2 PALPITATIONS: ICD-10-CM

## 2023-05-09 DIAGNOSIS — G90.A POTS (POSTURAL ORTHOSTATIC TACHYCARDIA SYNDROME): ICD-10-CM

## 2023-05-12 NOTE — PROGRESS NOTES
Daily Note     Today's date: 2023  Patient name: Andres Chan  : 2000  MRN: [id number]  Referring provider: Francis Limon MD  Dx:   Encounter Diagnosis     ICD-10-CM    1  Dizziness  R42       2  POTS (postural orthostatic tachycardia syndrome)  G90  A       3  Palpitations  R00 2                      Subjective: Feeling Ok today, Completed exercises at home   Objective: See treatment diary below      Assessment: Pt is being placed on 30 day hold as she transitions to HEP with independence  Pt agreeable  Plan: Continue per plan of care        Precautions: fall risk    Short Term Goal Expiration Date:(2/10/23)  Long Term Goal Expiration Date: (3/10/23)  POC Expiration Date: (23)           Manuals                                    Neuro Re-Ed         Rocker board        Standing EC  While alternating hitting foot and hand to beat of music - 3 songs      JPE        Seated march        Bird dog  [id number]       Tandem walk        Stand on Abran Telespreein w/ alt UE lift        Bosralph lungho w/ HT/HN        Standing on foam kicking soccer ball        Laundry basket        EC        Turn and grab        virtualis     Eye head coordination    VOR container    VOR   Walking EC  Holding pool noodles - 5 laps with increasing number of steps laternating eyes open and eyes closed              Ther Ex        10 min total bike    25 min lvl 1 0 varying levels of speed and intensity based on symptoms - therapist monitorig and instructing throughout    treadmill        bridges [id number]       plank 15 sec x 5               S/l hi abd 10x2 ea       SLR 10x2       clamshell gtb 10x 2       Ther Activity                        Gait Training                        Modalities

## 2023-05-19 ENCOUNTER — TELEPHONE (OUTPATIENT)
Dept: FAMILY MEDICINE CLINIC | Facility: CLINIC | Age: 23
End: 2023-05-19

## 2023-05-19 DIAGNOSIS — J45.909 REACTIVE AIRWAY DISEASE WITHOUT COMPLICATION, UNSPECIFIED ASTHMA SEVERITY, UNSPECIFIED WHETHER PERSISTENT: ICD-10-CM

## 2023-05-19 DIAGNOSIS — J30.1 ALLERGIC RHINITIS DUE TO POLLEN, UNSPECIFIED SEASONALITY: Primary | ICD-10-CM

## 2023-05-19 PROBLEM — J30.9 ALLERGIC RHINITIS: Status: ACTIVE | Noted: 2023-05-19

## 2023-05-19 RX ORDER — FLUTICASONE PROPIONATE 220 UG/1
2 AEROSOL, METERED RESPIRATORY (INHALATION) 2 TIMES DAILY
Qty: 12 G | Refills: 2 | Status: SHIPPED | OUTPATIENT
Start: 2023-05-19

## 2023-05-19 RX ORDER — MONTELUKAST SODIUM 10 MG/1
10 TABLET ORAL
Qty: 90 TABLET | Refills: 1 | Status: SHIPPED | OUTPATIENT
Start: 2023-05-19

## 2023-05-19 NOTE — TELEPHONE ENCOUNTER
----- Message from Carly Barnes sent at 5/19/2023  9:57 AM EDT -----  Regarding: FW: New Allergy Medicine  Contact: [phone]    ----- Message -----  From: Chris Carcamo  Sent: 5/19/2023   9:44 AM EDT  To: [facility]  Subject: New Allergy Medicine                             I am interested in trying one of the breathing specific allergy medicines we spoke about in the appointment as I have had more than a few days the past week or two having trouble getting a deep enough breath  The Singulair type medicine or the inhaled steroid  If you get a few minutes to call and quickly review how each is best used, maybe some combination of the two medicines is best    You can reach me at   Thank you!

## 2023-05-19 NOTE — TELEPHONE ENCOUNTER
I sent in scripts for flovent and singulair  Patient can try one or the other to begin with (whichever she prefers) and if needed she can begin the other as well   If she has additional questions let me know

## 2023-05-31 ENCOUNTER — OFFICE VISIT (OUTPATIENT)
Dept: NEUROLOGY | Facility: CLINIC | Age: 23
End: 2023-05-31
Payer: COMMERCIAL

## 2023-05-31 VITALS
DIASTOLIC BLOOD PRESSURE: 60 MMHG | HEART RATE: 84 BPM | SYSTOLIC BLOOD PRESSURE: 110 MMHG | BODY MASS INDEX: 22.57 KG/M2 | HEIGHT: 63 IN | WEIGHT: 127.4 LBS

## 2023-05-31 DIAGNOSIS — G90.A POTS (POSTURAL ORTHOSTATIC TACHYCARDIA SYNDROME): ICD-10-CM

## 2023-05-31 DIAGNOSIS — R42 VERTIGO: Primary | ICD-10-CM

## 2023-05-31 DIAGNOSIS — G43.109 MIGRAINE WITH AURA AND WITHOUT STATUS MIGRAINOSUS, NOT INTRACTABLE: ICD-10-CM

## 2023-05-31 DIAGNOSIS — G90.9 AUTONOMIC DYSFUNCTION: ICD-10-CM

## 2023-05-31 PROCEDURE — 99204 OFFICE O/P NEW MOD 45 MIN: CPT | Performed by: STUDENT IN AN ORGANIZED HEALTH CARE EDUCATION/TRAINING PROGRAM

## 2023-05-31 RX ORDER — ONDANSETRON 4 MG/1
4 TABLET, FILM COATED ORAL EVERY 8 HOURS PRN
Qty: 30 TABLET | Refills: 3 | Status: SHIPPED | OUTPATIENT
Start: 2023-05-31

## 2023-05-31 NOTE — PATIENT INSTRUCTIONS
Patient Instructions:  -please schedule your MRI brain with and without contrast  -continue using compression socks and maintaining hydration >2L daily   -we will request records from [facility] for your previous workup. Based on your previous workup, we can discuss any need for further workup  -follow up in about 6 months

## 2023-05-31 NOTE — PROGRESS NOTES
[facility] Neurology Consult  PATIENT:  Colleen Alcaraz  MRN:  [id number]  :  2000  DATE OF SERVICE:  2023  REFERRED BY: David Holder MD  PMD: Clive Garcia MD    Assessment/Plan:     Colleen Alcaraz is a very pleasant 23 y.o. female with a past medical history that includes referred here for evaluation of multiple complaints.     Vertigo  Visual disturbance  POTS  Autonomic dysfunction  -vestibular testing negative today. Neuro exam overall unremarkable  -recommend reevaluation with MRI brain w/wo   -presumptive diagnosis per pt based on previous testing from [facility]. Will request records. Could consider repeat tilt table testing in the future   -recommended continuation of using compression socks and maintaining hydration >2L daily  -will obtain neuropathy w/u    CC:   Migraines, POTS    History of Present Illness:     23 y.o. female with a past medical history that includes referred here for evaluation of multiple complaints.     Feels constant sense of motion. When closes her eyes she has poor proprioception. Often loses balance in wide-open areas. Worsened by wide open spaces, glass, long hallways, highway  Has hx migraines, multiple concussions  Majority of symptoms started 9 years ago. Started 2 years after a concussion resulting in LOC  Has had multiple mris, pet scans in the past  Not associated with migraines will have episodes of LOC for a second or two  Somewhat due to pots    eegs in the past including 48 hr eeg monitoring    Migraines - every 2-3 months gets a migraine. Visual aura, nausea mostly. Not followed by head pain.     POTS - diagnosed several years ago by a cardiologist              - fast changes in position including squats, burpees                compression socks are helpful    Water - 2 cups tea, water bottle 16 oz x4. 2-3 cups of tea at night    florinef - tried a long time ago    PT - balance exercises   [facility] - tilt table  testing    Concussion in march 2022 - within a few weeks had improvement with everything except for tinnitus  Pet scan - with concern for possible PANDAS    Past Medical History:   No past medical history on file.    Patient Active Problem List   Diagnosis    PCOS (polycystic ovarian syndrome)    POTS (postural orthostatic tachycardia syndrome)    Reactive hypoglycemia    Annual physical exam    Allergic rhinitis    Reactive airway disease without complication       Medications:      Current Outpatient Medications   Medication Sig Dispense Refill    chlorpheniramine (CHLOR-TRIMETON) 2 MG/5ML syrup Take 5 mL (2 mg total) by mouth every 4 (four) hours as needed for allergies 118 mL 0    fluticasone (Flovent HFA) 220 mcg/act inhaler Inhale 2 puffs 2 (two) times a day Rinse mouth after use. 12 g 2    levocetirizine (XYZAL) 5 MG tablet  (Patient not taking: Reported on 4/26/2023)      montelukast (SINGULAIR) 10 mg tablet Take 1 tablet (10 mg total) by mouth daily at bedtime 90 tablet 1    ondansetron (ZOFRAN) 4 mg tablet 1 tablet on the tongue and allow to dissolve (Patient not taking: Reported on 4/26/2023)       No current facility-administered medications for this visit.        Allergies:      Allergies   Allergen Reactions    Albuterol Anxiety, Confusion, Cough, Dizziness, Hyperactivity, Lightheadedness, Palpitations, Shortness Of Breath, Tachycardia and Wheezing    Fruit Extracts Cough, Dizziness, Eye Swelling, Hives, Itching, Lip Swelling, Nasal Congestion, Palpitations, Rash, Shortness Of Breath, Sneezing, Swelling, Throat Swelling and Wheezing    Nuts - Food Allergy Cough, Hives, Itching, Palpitations, Shortness Of Breath and Swelling    Other Cough, Hives, Itching, Lip Swelling, Rash, Shortness Of Breath, Sneezing and Throat Swelling    Tree Extract Anaphylaxis     Tree nuts    Vegetable Extract Cough, Hives, Itching, Lip Swelling, Rash, Shortness Of Breath and Wheezing    Doxycycline Dizziness, Drowsiness,  "Fatigue, Fever, Headache and Rash       Family History:     No family history on file.    Social History:       Social History     Socioeconomic History    Marital status: Single     Spouse name: Not on file    Number of children: Not on file    Years of education: Not on file    Highest education level: Not on file   Occupational History    Not on file   Tobacco Use    Smoking status: Never    Smokeless tobacco: Never   Substance and Sexual Activity    Alcohol use: Yes     Comment: social    Drug use: Never    Sexual activity: Not on file   Other Topics Concern    Not on file   Social History Narrative    Not on file     Social Determinants of Health     Financial Resource Strain: Not on file   Food Insecurity: Not on file   Transportation Needs: Not on file   Physical Activity: Not on file   Stress: Not on file   Social Connections: Not on file   Intimate Partner Violence: Not on file   Housing Stability: Not on file         Objective:   /60 (BP Location: Left arm, Patient Position: Standing, Cuff Size: Standard)   Pulse 84   Ht 5' 3\" (1.6 m)   Wt 57.8 kg (127 lb 6.4 oz)   BMI 22.57 kg/m²     General: Patient is not in any acute/apparent distress, well nourished, well developed and cooperative.   HEENT: normocephalic, atraumatic, moist membranes  Neck: supple  Extremities: no edema noted   Skin: no lesions or rash  Musculosketal: no bony abnormalities    Neurologic Examination:   Mental status: alert, awake, oriented X 3 and following commands.     Speech/Language: Speech is fluent without any dysarthria, no aphasia noted, can name, comprehension intact    Cranial Nerves:   CN I: smell not tested  CN II: Visual fields full to confrontation  CN III, IV, VI: Extraocular movements intact bilaterally. Pupils equal round and reactive to light bilaterally.  CN V: Facial sensation is normal.  CN VII: Full and symmetric facial movement.  CN VIII: Hearing is normal.  CN IX, X: Palate elevates symmetrically.   CN " XI: Shoulder shrug strength is normal.  CN XII: Tongue midline without atrophy or fasciculations.    Motor:   Strength 5/5 in all 4 extremities. Bulk/tone - normal.  Fasiculations - none    Sensory:   Sensation intact to soft touch, vibration and pinprick in all 4 extremities.    Cerebellar:   Finger-to-nose intact, normal heel to shin.    Reflexes: 2+ in all 4 extremities  Pathologic reflexes - babinski reflex negative    Gait:   Normal gait, able to tandem walk, Romberg sign negative     Review of Systems:     Review of Systems   Constitutional: Negative.  Negative for appetite change and fever.   HENT: Positive for tinnitus (Sometimes). Negative for hearing loss, trouble swallowing and voice change.    Eyes: Negative.  Negative for photophobia, pain and visual disturbance.   Respiratory: Negative.  Negative for shortness of breath.    Cardiovascular: Positive for palpitations (Sometimes).   Gastrointestinal: Negative.  Negative for nausea and vomiting.   Endocrine: Negative.  Negative for cold intolerance.   Genitourinary: Negative.  Negative for dysuria, frequency and urgency.   Musculoskeletal: Negative.  Negative for gait problem, myalgias and neck pain.   Skin: Negative.  Negative for rash.   Allergic/Immunologic: Negative.    Neurological: Positive for dizziness, tremors, light-headedness and headaches. Negative for seizures, syncope, facial asymmetry, speech difficulty, weakness and numbness.        Loss of balance   Hematological: Negative.  Does not bruise/bleed easily.   Psychiatric/Behavioral: Positive for sleep disturbance. Negative for confusion and hallucinations.     I have spent a total time of 56 minutes on 5/31/23 in caring for this patient including Risks and benefits of tx options, Instructions for management, Patient and family education, Risk factor reductions, Impressions, Documenting in the medical record, Reviewing / ordering tests, medicine, procedures  , and Obtaining or reviewing  history  .

## 2023-06-06 ENCOUNTER — OFFICE VISIT (OUTPATIENT)
Dept: PHYSICAL THERAPY | Facility: CLINIC | Age: 23
End: 2023-06-06
Payer: COMMERCIAL

## 2023-06-06 DIAGNOSIS — G90.A POTS (POSTURAL ORTHOSTATIC TACHYCARDIA SYNDROME): ICD-10-CM

## 2023-06-06 DIAGNOSIS — R42 DIZZINESS: Primary | ICD-10-CM

## 2023-06-06 DIAGNOSIS — R00.2 PALPITATIONS: ICD-10-CM

## 2023-06-06 PROCEDURE — 97110 THERAPEUTIC EXERCISES: CPT | Performed by: PHYSICAL THERAPIST

## 2023-06-09 NOTE — PROGRESS NOTES
Re-evaluation Note     Today's date: 2023  Patient name: Elpidio Harris  : 2000  MRN: [id number]  Referring provider: Annalise Garza MD  Dx:   Encounter Diagnosis     ICD-10-CM    1  Dizziness  R42       2  POTS (postural orthostatic tachycardia syndrome)  G90  A       3  Palpitations  R00 2                      Pt presents to OP PT for PN/RE following 8 visits of skilled therapy intervention  During this time pt has completed interventions targeting cardiovascular endurance, balance, visual spatial and kinesthetic awareness  Per pt report, she is now able to better /moderate her activity level to avoid POTS symptoms and is exercising more  Still notes difficulty w/ spatial and kinesthetic awareness  Pt has made minimal substantial improvements since last Re-evaluation  She struggles somewhat with consistency of completing exercises and lacks the ability to utilize a recumbnet bike but plans to attempt to find one which will likely be of significant help when she is not feeling well with pots symptoms to be able to still exercise  Pt agreeable   At this time pt will be place don hold with re-assessment in 2 months for progress with balance and also with overall exercise for POTS management          Goals  STG:  Pt will be able to stand statically with her eyes closed on the floor for 30 sec within 4 weeks  MET  Pt will be able to exercise without symptoms within 4 weeks  - able to better control  Pt will be independent with home exercise program within 4 weeks  MET    LTG  Pt will be able to self progress through exercise program for POTS within 8 weeks without symptoms  ONGOING  Pt will report low disability index on DHI for balance within 8 weeks  not met  Pt will be independent and consistent with HEP for balance within 8 weeks  not met    Plan  Plan details: Suggestion is twice a week but may only be able to do 1 day per week due to copay as well as schedule conflicts  Patient would benefit from: skilled physical therapy  Planned therapy interventions: neuromuscular re-education, balance, balance/weight bearing training, home exercise program, therapeutic exercise, therapeutic activities and gait training  Frequency: 2x week  Duration in weeks: 8  Treatment plan discussed with: patientPrecautions: fall risk    Short Term Goal Expiration Date:(2/10/23)  Long Term Goal Expiration Date: (3/10/23)  POC Expiration Date: (5/2/23)           Manuals 4/25 5/2 6/6 4/4 4/21                                   Neuro Re-Ed         Rocker board        Standing EC  While alternating hitting foot and hand to beat of music - 3 songs      JPE        Seated march        Bird dog  [id number]       Tandem walk        Stand on Bosu w/ alt UE lift        Bosu lunges w/ HT/HN        Standing on foam kicking soccer ball        Laundry basket        EC        Turn and grab        virtualis     Eye head coordination    VOR container    VOR   Walking EC  Holding pool noodles - 5 laps with increasing number of steps laternating eyes open and eyes closed              Ther Ex        10 min total bike    25 min lvl 1 0 varying levels of speed and intensity based on symptoms - therapist monitorig and instructing throughout    treadmill        bridges [id number]       plank 15 sec x 5               S/l hi abd 10x2 ea       SLR 10x2       clamshell gtb 10x 2       Ther Activity                        Gait Training                        Modalities

## 2023-07-07 ENCOUNTER — TELEPHONE (OUTPATIENT)
Dept: NEUROLOGY | Facility: CLINIC | Age: 23
End: 2023-07-07

## 2023-07-13 ENCOUNTER — TELEPHONE (OUTPATIENT)
Dept: NEUROLOGY | Facility: CLINIC | Age: 23
End: 2023-07-13

## 2023-07-13 NOTE — TELEPHONE ENCOUNTER
Recd    this is vishal sawyer and my YOB: 2000. And you can reach me at this phone number. And I made an appointment with [facility] in [place] to have blood work done this Saturday morning. And I was going to send them the scripts for the blood work, but I don't have a printer. And I was wondering if the office could maybe fax it or provide it to them in some other way. So if you could call me back or if you could call request labs in [place] and send over the scripts, that would be great. Thank you so much  fax [phone]    I looked up fax # and sent to [phone]    Patient is aware; also aware of labs that were ordered; she will make sure all scripts received by [facility] prior to her appointment.

## 2023-07-18 ENCOUNTER — OFFICE VISIT (OUTPATIENT)
Dept: PHYSICAL THERAPY | Facility: CLINIC | Age: 23
End: 2023-07-18
Payer: COMMERCIAL

## 2023-07-18 DIAGNOSIS — G90.A POTS (POSTURAL ORTHOSTATIC TACHYCARDIA SYNDROME): Primary | ICD-10-CM

## 2023-07-18 LAB
ALBUMIN SERPL-MCNC: 4.5 G/DL (ref 3.6–5.1)
ALBUMIN/GLOB SERPL: 1.9 (CALC) (ref 1–2.5)
ALP SERPL-CCNC: 68 U/L (ref 31–125)
ALT SERPL-CCNC: 26 U/L (ref 6–29)
ANA SER QL IF: NEGATIVE
AST SERPL-CCNC: 17 U/L (ref 10–30)
BASOPHILS # BLD AUTO: 31 CELLS/UL (ref 0–200)
BASOPHILS NFR BLD AUTO: 0.4 %
BILIRUB SERPL-MCNC: 0.6 MG/DL (ref 0.2–1.2)
BUN SERPL-MCNC: 14 MG/DL (ref 7–25)
BUN/CREAT SERPL: NORMAL (CALC) (ref 6–22)
CALCIUM SERPL-MCNC: 9.6 MG/DL (ref 8.6–10.2)
CHLORIDE SERPL-SCNC: 101 MMOL/L (ref 98–110)
CO2 SERPL-SCNC: 25 MMOL/L (ref 20–32)
CREAT SERPL-MCNC: 0.78 MG/DL (ref 0.5–0.96)
CRP SERPL-MCNC: 0.5 MG/L
ENA SS-A AB SER IA-ACNC: NORMAL AI
ENA SS-B AB SER IA-ACNC: NORMAL AI
EOSINOPHIL # BLD AUTO: 211 CELLS/UL (ref 15–500)
EOSINOPHIL NFR BLD AUTO: 2.7 %
ERYTHROCYTE [DISTWIDTH] IN BLOOD BY AUTOMATED COUNT: 11.9 % (ref 11–15)
ERYTHROCYTE [SEDIMENTATION RATE] IN BLOOD BY WESTERGREN METHOD: 2 MM/H
EST. AVERAGE GLUCOSE BLD GHB EST-MCNC: 91 MG/DL
EST. AVERAGE GLUCOSE BLD GHB EST-SCNC: 5 MMOL/L
FOLATE SERPL-MCNC: 20 NG/ML
GFR/BSA.PRED SERPLBLD CYS-BASED-ARV: 109 ML/MIN/1.73M2
GLOBULIN SER CALC-MCNC: 2.4 G/DL (CALC) (ref 1.9–3.7)
GLUCOSE SERPL-MCNC: 83 MG/DL (ref 65–99)
HBA1C MFR BLD: 4.8 % OF TOTAL HGB
HCT VFR BLD AUTO: 43 % (ref 35–45)
HGB BLD-MCNC: 14.4 G/DL (ref 11.7–15.5)
LYMPHOCYTES # BLD AUTO: 2426 CELLS/UL (ref 850–3900)
LYMPHOCYTES NFR BLD AUTO: 31.1 %
MCH RBC QN AUTO: 32.1 PG (ref 27–33)
MCHC RBC AUTO-ENTMCNC: 33.5 G/DL (ref 32–36)
MCV RBC AUTO: 95.8 FL (ref 80–100)
MONOCYTES # BLD AUTO: 452 CELLS/UL (ref 200–950)
MONOCYTES NFR BLD AUTO: 5.8 %
NEUTROPHILS # BLD AUTO: 4680 CELLS/UL (ref 1500–7800)
NEUTROPHILS NFR BLD AUTO: 60 %
PLATELET # BLD AUTO: 237 THOUSAND/UL (ref 140–400)
PMV BLD REES-ECKER: 9.7 FL (ref 7.5–12.5)
POTASSIUM SERPL-SCNC: 4.4 MMOL/L (ref 3.5–5.3)
PROT SERPL-MCNC: 6.9 G/DL (ref 6.1–8.1)
RBC # BLD AUTO: 4.49 MILLION/UL (ref 3.8–5.1)
RHEUMATOID FACT SERPL-ACNC: <14 IU/ML
SODIUM SERPL-SCNC: 136 MMOL/L (ref 135–146)
TSH SERPL-ACNC: 1.04 MIU/L
VIT B12 SERPL-MCNC: 1644 PG/ML (ref 200–1100)
WBC # BLD AUTO: 7.8 THOUSAND/UL (ref 3.8–10.8)

## 2023-07-18 PROCEDURE — 97112 NEUROMUSCULAR REEDUCATION: CPT | Performed by: PHYSICAL THERAPIST

## 2023-07-19 NOTE — PROGRESS NOTES
Re-evaluation Note     Today's date: 2023  Patient name: Elsa Boswell  : 2000  MRN: [id number]  Referring provider: Donna Ayala MD  Dx:   Encounter Diagnosis     ICD-10-CM    1. POTS (postural orthostatic tachycardia syndrome)  G90. A                      Subjective: Patient reports feeling POTS symptoms about the same. Did have one instance of falling in the shower due to visual spatial disorientation. Has not been exercising regularly and is still looking to find the right fit in a gym or execise routine. Has been looking at gyms and considering options       Objective: See treatment diary below      Assessment: At this time patient will benefit from focused HEP once she has established where and what she would prefer to do in order to continue to improve her POTS symptoms. Would like to hold on any further intervention for vestibular therapy. Plan to reassess in 3 months. Plan: Continue per plan of care.       Precautions: fall risk    Short Term Goal Expiration Date:(2/10/23)  Long Term Goal Expiration Date: (3/10/23)  POC Expiration Date: (23)           Manuals                                    Neuro Re-Ed         Rocker board        Standing EC  While alternating hitting foot and hand to beat of music - 3 songs      JPE        Seated march        Bird dog  [id number]       Tandem walk        Stand on Bosu w/ alt UE lift        Bosu lunges w/ HT/HN        Standing on foam kicking soccer ball        Laundry basket        EC        Turn and grab        virtualis     Eye head coordination    VOR container    VOR   Walking EC  Holding pool noodles - 5 laps with increasing number of steps laternating eyes open and eyes closed              Ther Ex        10 min total bike    25 min lvl 1 0 varying levels of speed and intensity based on symptoms - therapist monitorig and instructing throughout    treadmill        bridges [id number]       plank 15 sec x 5 S/l hi abd 10x2 ea       SLR 10x2       emmanuelle gtb 10x 2       Ther Activity                        Gait Training                        Modalities

## 2023-07-26 ENCOUNTER — TELEPHONE (OUTPATIENT)
Dept: OBGYN CLINIC | Facility: MEDICAL CENTER | Age: 23
End: 2023-07-26

## 2023-07-26 NOTE — TELEPHONE ENCOUNTER
Attempted to call patient to discuss concerns and symptoms. Unable to leave message due to mailbox not being set up.   Patient has never been seen in our office before - has appointment scheduled with Dr. Misael Chawla in August.  Will try and contact patient again

## 2023-07-26 NOTE — TELEPHONE ENCOUNTER
Contacted patient - advised she has never been seen in our office before so we cannot give her any recommendations or medical advise.   Advised patient to follow up with previous OBGYN

## 2023-07-26 NOTE — TELEPHONE ENCOUNTER
Patient is returning a voicemail patient would like to speak to someone about her intense period cramps/nausea. Please review when you get a chance.  Thank you

## 2023-07-26 NOTE — TELEPHONE ENCOUNTER
Patient called stating she is having intense period cramps and nausea/possible palpitations she thinks. Bleeding amount is typical and patient also has PCOS so she states the bleeding amount is "normal" but the cramps are worse than they usually are. She feels shaky and midol/heating pad is not alleviating pain/symptoms. Please review.

## 2023-08-25 ENCOUNTER — TELEPHONE (OUTPATIENT)
Dept: NEUROLOGY | Facility: CLINIC | Age: 23
End: 2023-08-25

## 2023-08-25 NOTE — TELEPHONE ENCOUNTER
Patient was schedule for 12/8 with Dr. Herrera Bedolla. Dr. Herrera Bedolla will be unavailable this day. I spoke with patient and she agreed with rescheduling. Her appointment is reschedule for 12/15 at 3:30 pm with Dr. Herrera Bedolla. I sent an reminder card as well.

## 2023-09-12 ENCOUNTER — OFFICE VISIT (OUTPATIENT)
Dept: OBGYN CLINIC | Facility: MEDICAL CENTER | Age: 23
End: 2023-09-12
Payer: COMMERCIAL

## 2023-09-12 VITALS — BODY MASS INDEX: 22.36 KG/M2 | HEIGHT: 63 IN | WEIGHT: 126.2 LBS

## 2023-09-12 DIAGNOSIS — Z12.4 ENCOUNTER FOR SCREENING FOR CERVICAL CANCER: ICD-10-CM

## 2023-09-12 DIAGNOSIS — Z01.419 ENCOUNTER FOR WELL WOMAN EXAM WITH ROUTINE GYNECOLOGICAL EXAM: Primary | ICD-10-CM

## 2023-09-12 PROCEDURE — S0610 ANNUAL GYNECOLOGICAL EXAMINA: HCPCS | Performed by: OBSTETRICS & GYNECOLOGY

## 2023-09-12 PROCEDURE — G0145 SCR C/V CYTO,THINLAYER,RESCR: HCPCS | Performed by: OBSTETRICS & GYNECOLOGY

## 2023-09-12 NOTE — PROGRESS NOTES
[facility] [address] [place] [address]    ASSESSMENT/PLAN: Leonardo Gray is a 21 y.o. No obstetric history on file. who presents for annual gynecologic exam.    Encounter for routine gynecologic examination  - Routine well woman exam completed today. - Cervical Cancer Screening: Current ASCCP Guidelines reviewed. Last Pap: Not on file . Next Pap Due: today  - HPV Vaccination status: Immunization series complete  - STI screening offered including HIV testing: not done  - Contraceptive counseling discussed. Current contraception: none     Additional problems addressed during this visit:  1. Encounter for well woman exam with routine gynecological exam    2. Encounter for screening for cervical cancer  -     Liquid-based pap, screening    CC: Annual Gynecologic Examination    HPI: Leonardo Gray is a 21 y.o. No obstetric history on file. who presents for annual gynecologic examination. HPI  For first GYN exam, reports she was diagnosed with PCOS at age 12. States her irregular cycles have improved with lifestyle modification. The following portions of the patient's history were reviewed and updated as appropriate: allergies, current medications, past family history, past medical history, obstetric history, gynecologic history, past social history, past surgical history and problem list.    Review of Systems   Constitutional: Negative. HENT: Negative. Eyes: Negative. Respiratory: Negative. Cardiovascular: Negative. Gastrointestinal: Negative. Genitourinary: Negative. Musculoskeletal: Negative. All other systems reviewed and are negative. Objective:  Ht 5' 3" (1.6 m)   Wt 57.2 kg (126 lb 3.2 oz)   LMP 08/28/2023 (Exact Date)   BMI 22.36 kg/m²    Physical Exam  Vitals reviewed. Constitutional:       General: She is not in acute distress. Appearance: She is well-developed. HENT:      Head: Normocephalic and atraumatic. Nose: Nose normal.   Cardiovascular:      Rate and Rhythm: Normal rate. Pulmonary:      Effort: Pulmonary effort is normal. No respiratory distress. Chest:   Breasts:     Breasts are symmetrical.      Right: Normal. No mass, nipple discharge, skin change or tenderness. Left: Normal. No mass, nipple discharge, skin change or tenderness. Abdominal:      General: There is no distension. Palpations: Abdomen is soft. There is no mass. Tenderness: There is no abdominal tenderness. There is no guarding or rebound. Genitourinary:     General: Normal vulva. Exam position: Lithotomy position. Labia:         Right: No lesion. Left: No lesion. Urethra: No prolapse (urethral meatus normal). Vagina: Normal. No vaginal discharge, erythema or bleeding. Cervix: Normal.      Uterus: Normal.       Adnexa: Right adnexa normal and left adnexa normal.   Musculoskeletal:         General: Normal range of motion. Cervical back: Normal range of motion. Lymphadenopathy:      Upper Body:      Right upper body: No supraclavicular, axillary or pectoral adenopathy. Left upper body: No supraclavicular, axillary or pectoral adenopathy. Lower Body: No right inguinal adenopathy. No left inguinal adenopathy. Skin:     General: Skin is warm and dry. Neurological:      Mental Status: She is alert and oriented to person, place, and time. Psychiatric:         Behavior: Behavior normal.         Thought Content:  Thought content normal.         Judgment: Judgment normal.

## 2023-09-18 LAB
LAB AP GYN PRIMARY INTERPRETATION: NORMAL
Lab: NORMAL

## 2023-09-21 ENCOUNTER — TELEPHONE (OUTPATIENT)
Dept: FAMILY MEDICINE CLINIC | Facility: CLINIC | Age: 23
End: 2023-09-21

## 2023-09-21 NOTE — TELEPHONE ENCOUNTER
I do not recommend the J&J vaccine due to more side effects and less effective. The safety / efficacy of Pfizer and Brain Stewart seem similar so she can weigh her risks and benefits and consider either. She can call back with other questions or concerns.

## 2023-09-21 NOTE — TELEPHONE ENCOUNTER
----- Message from [place], [place] sent at 9/18/2023  7:38 PM EDT -----  Regarding: New COVID Vaccine  Contact: [phone]  Please see message       ----- Message -----  From: Leonardogilberto Gray  Sent: 9/18/2023   7:35 PM EDT  To: [facility]  Subject: New COVID Vaccine                                Hello! I am going on a trip next month and want to get a new COVID vaccine. I never had COVID, but I have had the Linda and the Moderna vaccines before (both over a year ago). The [facility] across the street from my apartment provides [employer] or [employer], but I am not sure what the effective difference is between them. Do you have a preference or recommend one over the other for a trip that is less than a month away from when I might get the vaccine? Also, I have a lot of allergies but I never had a severe reaction to a vaccine before, but I dont know what the ingredients difference is between the [employer], or if it would really matter for me. Please feel free to respond here or call me anytime! I do work from [address] to 5 PM, Monday through Friday, in case I miss a call.  Thank you!!

## 2023-10-24 ENCOUNTER — TELEPHONE (OUTPATIENT)
Dept: NEUROLOGY | Facility: CLINIC | Age: 23
End: 2023-10-24

## 2023-10-24 NOTE — TELEPHONE ENCOUNTER
Hello,     Patient has called regarding some results she has I have informed the several way's patient can have their results or images uploaded but she is asking for someone in the office to call her back at [phone]. Can you please assist pt?     Thank you in advance,     Citlali Haney [Fatigue] : fatigue [Vision Problems] : vision problems [Negative] : Allergic/Immunologic [Patient Intake Form Reviewed] : Patient intake form was reviewed [Dry Eyes] : dryness of the eyes [Fever] : no fever [Chills] : no chills [Night Sweats] : no night sweats [Recent Change In Weight] : ~T no recent weight change [Eye Pain] : no eye pain [Red Eyes] : eyes not red [Skin Rash] : no skin rash [Easy Bruising] : no tendency for easy bruising [FreeTextEntry3] : now needs reading glasses [FreeTextEntry8] : heavy menstrual flow [de-identified] : Has Hair loss, nail fragility

## 2023-10-25 ENCOUNTER — TELEPHONE (OUTPATIENT)
Dept: NEUROLOGY | Facility: CLINIC | Age: 23
End: 2023-10-25

## 2023-10-25 NOTE — TELEPHONE ENCOUNTER
Lmom for patient to call me back regarding tests results or images that she has, left my direct number

## 2023-11-02 ENCOUNTER — OFFICE VISIT (OUTPATIENT)
Dept: PHYSICAL THERAPY | Facility: REHABILITATION | Age: 23
End: 2023-11-02
Payer: COMMERCIAL

## 2023-11-02 DIAGNOSIS — M79.632 LEFT FOREARM PAIN: Primary | ICD-10-CM

## 2023-11-02 PROCEDURE — 97161 PT EVAL LOW COMPLEX 20 MIN: CPT | Performed by: PHYSICAL THERAPIST

## 2023-11-02 PROCEDURE — 97112 NEUROMUSCULAR REEDUCATION: CPT | Performed by: PHYSICAL THERAPIST

## 2023-11-02 PROCEDURE — 97140 MANUAL THERAPY 1/> REGIONS: CPT | Performed by: PHYSICAL THERAPIST

## 2023-11-02 NOTE — PROGRESS NOTES
PT Evaluation     Today's date: 2023  Patient name: Viola Cain  : 2000  MRN: [id number]  Referring provider: No ref. provider found  Dx:   Encounter Diagnosis     ICD-10-CM    1. Left forearm pain  M79.632           Start Time: 171  Stop Time: 175  Total time in clinic (min): 40 minutes    Assessment  Assessment details: Problem List:  1) Painful resisted wrist extension  2) pain with gripping/twisting     Myranda Alcaraz is a pleasant 21 y.o. female who presents with left wrist pain that began after doing rowing at the gym. Pain localized to ECU and ECU tendon at wrist. Pain reproduced with resisted extension with ulnar deviation. No further referral appears necessary at this time based upon examination results. I expect she will improve in 4 weeks. Myranda Hudson would benefit from skilled physical therapy to address her limitations and allow her to return to working out without pain. Impairments: abnormal or restricted ROM, activity intolerance, impaired physical strength, lacks appropriate home exercise program, pain with function, weight-bearing intolerance and poor posture     Symptom irritability: lowUnderstanding of Dx/Px/POC: good   Prognosis: good    Goals  ST-4 weeks  Patient will be independent with home exercise program.   Patient will be able to manage symptoms independently.   Patient will decrease pain by 25-50%    LTG: by discharge  Patient will improve FOTO to goal  Patient will report minimal (1-2/10) pain with aggravating activities to display improvements in overall functional status  Patient will have pain free resisted wrist extension  Patient will report opening jars without pain          Plan  Patient would benefit from: skilled physical therapy  Planned modality interventions: cryotherapy, thermotherapy: hydrocollator packs and unattended electrical stimulation  Planned therapy interventions: IADL retraining, joint mobilization, manual therapy, massage, ADL training, activity modification, abdominal trunk stabilization, ADL retraining, balance, balance/weight bearing training, neuromuscular re-education, body mechanics training, behavior modification, strengthening, stretching, therapeutic activities, therapeutic exercise, therapeutic training, transfer training, graded exercise, graded motor, home exercise program, graded activity, gait training, functional ROM exercises, patient education, postural training, IASTM, kinesiology taping and flexibility  Frequency: 2x week  Duration in visits: 16  Duration in weeks: 8  Treatment plan discussed with: patient        Subjective Evaluation    History of Present Illness  Mechanism of injury: Tahir Bhatt is a 21 y.o. female presenting to physical therapy on 23 with referral from [facility] for bilateral wrist pain that began when she started using the rowing machine at the gym. Pain mostly on the left side at this time. Pain now mostly all the time and she has difficulty opening jars and using her hands. Has some relief with stretching. Denies numbness/tingling. Pain is on the outside back of the wrist.           Quality of life: good    Patient Goals  Patient goals for therapy: increased strength, independence with ADLs/IADLs, return to sport/leisure activities, decreased pain and increased motion  Patient goal: improve , get back to working  Pain  Current pain ratin  At best pain ratin  At worst pain ratin  Location: L dorsal wrist  Quality: dull ache  Relieving factors: relaxation, rest and change in position  Aggravating factors: lifting (opening jars, twisting motions, gripping)  Progression: worsening      Diagnostic Tests  No diagnostic tests performed        Objective     Palpation   Left   Hypertonic in the extensor carpi ulnaris. Tenderness of the extensor carpi radialis brevis, extensor carpi radialis longus and extensor carpi ulnaris. Trigger point to extensor carpi ulnaris. Neurological Testing     Sensation     Wrist/Hand   Left   Intact: light touch    Right   Intact: light touch    Passive Range of Motion     Left Wrist   Normal passive range of motion    Right Wrist   Normal passive range of motion    Strength/Myotome Testing     Left Wrist/Hand   Wrist extension: 4+ (pain with ulnar deviation/extension)  Wrist flexion: 5  Radial deviation: 5  Ulnar deviation: 5     (2nd hand position)     Trial 1: 45    Trial 2: 55    Trial 3: 50    Average: 50    Right Wrist/Hand      (2nd hand position)     Trial 1: 60    Trial 2: 57    Trial 3: 58    Average: 58.33         Precautions: standard    Manuals 11/2            ECU STM 9'                                                   Neuro Re-Ed 11/2            Wrist iso  HEP 5x10"            Wrist ext with ulnar dev 3x OTB HEP                                                                HEP/education 8'            Ther Ex 11/2                                                                                                                    Ther Activity                                       Gait Training                                       Modalities

## 2023-11-07 ENCOUNTER — HOSPITAL ENCOUNTER (OUTPATIENT)
Dept: RADIOLOGY | Facility: HOSPITAL | Age: 23
Discharge: HOME | End: 2023-11-07
Attending: STUDENT IN AN ORGANIZED HEALTH CARE EDUCATION/TRAINING PROGRAM
Payer: COMMERCIAL

## 2023-11-07 ENCOUNTER — HOSPITAL ENCOUNTER (EMERGENCY)
Facility: HOSPITAL | Age: 23
Discharge: HOME | End: 2023-11-07
Attending: EMERGENCY MEDICINE
Payer: COMMERCIAL

## 2023-11-07 VITALS
DIASTOLIC BLOOD PRESSURE: 72 MMHG | RESPIRATION RATE: 18 BRPM | HEART RATE: 82 BPM | OXYGEN SATURATION: 97 % | SYSTOLIC BLOOD PRESSURE: 110 MMHG

## 2023-11-07 DIAGNOSIS — T78.40XA ALLERGIC REACTION, INITIAL ENCOUNTER: Primary | ICD-10-CM

## 2023-11-07 DIAGNOSIS — R42 VERTIGO: ICD-10-CM

## 2023-11-07 DIAGNOSIS — G43.109 MIGRAINE WITH AURA AND WITHOUT STATUS MIGRAINOSUS, NOT INTRACTABLE: ICD-10-CM

## 2023-11-07 PROCEDURE — 99282 EMERGENCY DEPT VISIT SF MDM: CPT

## 2023-11-07 PROCEDURE — G1004 CDSM NDSC: HCPCS

## 2023-11-07 PROCEDURE — A9585 GADOBUTROL INJECTION: HCPCS | Performed by: FAMILY MEDICINE

## 2023-11-07 PROCEDURE — 70553 MRI BRAIN STEM W/O & W/DYE: CPT

## 2023-11-07 PROCEDURE — 99284 EMERGENCY DEPT VISIT MOD MDM: CPT | Performed by: EMERGENCY MEDICINE

## 2023-11-07 RX ORDER — IBUPROFEN 600 MG/1
600 TABLET ORAL ONCE
Status: COMPLETED | OUTPATIENT
Start: 2023-11-07 | End: 2023-11-07

## 2023-11-07 RX ORDER — GADOBUTROL 604.72 MG/ML
5 INJECTION INTRAVENOUS
Status: COMPLETED | OUTPATIENT
Start: 2023-11-07 | End: 2023-11-07

## 2023-11-07 RX ORDER — EPINEPHRINE 0.3 MG/.3ML
0.3 INJECTION SUBCUTANEOUS ONCE
Qty: 0.6 ML | Refills: 0 | Status: SHIPPED | OUTPATIENT
Start: 2023-11-07 | End: 2023-11-07

## 2023-11-07 RX ADMIN — GADOBUTROL 5 ML: 604.72 INJECTION INTRAVENOUS at 19:28

## 2023-11-07 RX ADMIN — IBUPROFEN 600 MG: 600 TABLET, FILM COATED ORAL at 21:21

## 2023-11-08 ENCOUNTER — TELEPHONE (OUTPATIENT)
Dept: RADIOLOGY | Facility: HOSPITAL | Age: 23
End: 2023-11-08

## 2023-11-08 NOTE — DISCHARGE INSTRUCTIONS
Please use EpiPen for any future allergic reaction. Please return to the emergency department if you have any new or concerning symptoms including shortness of breath, feeling her throat is closing, or other concerns.

## 2023-11-08 NOTE — ED ATTENDING ATTESTATION
Aristeo Singh MD, saw and evaluated the patient. I have discussed the patient with the resident and agree with the resident's findings, Plan of Care, and MDM as documented in the resident's note, except where noted. All available labs and Radiology studies were reviewed. I was present for key portions of any procedure(s) performed by the resident and I was immediately available to provide assistance. At this point I agree with the current assessment done in the Emergency Department. I have conducted an independent evaluation of this patient a history and physical is as follows:    22 yo female with a history of PCOS, POTS, depression, and migraine headaches sent to the ED from radiology for evaluation of a minor allergic reaction. The patient was undergoing a MRI head with contrast and developed a few hives to her left flank after the contrast was injected. She also experienced some transient itching of her eyes. No shortness of breath, chest pain, tongue swelling, sore throat, or wheezing. She denies nausea, vomiting, diarrhea, and abdominal pain. The patient does have multiple known allergies and has an Epi-pen at home. She has had IV contrast without incidence in the past. No other specific complaints. *All symptoms resolved prior to my evaluation. ROS: per resident physician note    Gen: NAD, AA&Ox3  HEENT: PERRL, EOMI  Neck: supple  CV: RRR  Lungs: CTA B/L  Abdomen: soft, NT/ND  Ext: no swelling or deformity  Neuro: 5/5 strength all extremities, sensation grossly intact  Skin: no rash    ED Course  The patient is very well appearing with stable vital signs and a benign physical exam. Rash and eye pruritus resolved spontaneously prior to my examination. She says she feels "fine" other than a mild headache and is requesting discharge. Plan for follow up with her PCP later this week for reassessment. She is agreeable to this plan. Strict return precautions provided.       Critical Care Time  Procedures

## 2023-11-08 NOTE — ED PROVIDER NOTES
History  Chief Complaint   Patient presents with    Allergic Reaction     Per pt she was at MRI when they injected contrast, now she has a hives. Patient is a 77-year-old female who presents with allergic reaction. Patient states that approximately 1 and half hours ago she was getting her eye of her head and was injected with a contrast.  She says that shortly after she developed itching on her left flank and noticed a small area of hives. She also noticed itching of her eyes. She denied any rashes in her mouth, difficulty breathing, throat swelling, diarrhea, nausea, or vomiting. She says that she was sent to the emergency department to be evaluated. Patient states that she has multiple allergies to foods at medicine's, she does have an EpiPen at home. ROS otherwise negative. Prior to Admission Medications   Prescriptions Last Dose Informant Patient Reported? Taking?   chlorpheniramine (CHLOR-TRIMETON) 2 MG/5ML syrup   No No   Sig: Take 5 mL (2 mg total) by mouth every 4 (four) hours as needed for allergies   ondansetron (ZOFRAN) 4 mg tablet   No No   Sig: Take 1 tablet (4 mg total) by mouth every 8 (eight) hours as needed for nausea or vomiting      Facility-Administered Medications: None       Past Medical History:   Diagnosis Date    Depression 06/1/2014    Migraine August 2014    I had severe migraines in high school, I rarely get migraines now but have started getting ocular migraines in the past year    Polycystic ovary syndrome 2016    ultrasound       History reviewed. No pertinent surgical history. Family History   Problem Relation Age of Onset    Seizures Brother         PANDAS/Auto-immune incephalitis     I have reviewed and agree with the history as documented.     E-Cigarette/Vaping     E-Cigarette/Vaping Substances     Social History     Tobacco Use    Smoking status: Never    Smokeless tobacco: Never   Substance Use Topics    Alcohol use: Not Currently     Comment: social    Drug use: Never            Physical Exam  ED Triage Vitals   Temp Pulse Respirations Blood Pressure SpO2   -- 11/07/23 2016 11/07/23 2016 11/07/23 2016 11/07/23 2016    70 18 117/70 100 %      Temp src Heart Rate Source Patient Position - Orthostatic VS BP Location FiO2 (%)   -- 11/07/23 2016 11/07/23 2016 11/07/23 2016 --    Monitor Lying Left arm       Pain Score       11/07/23 2121       5             Orthostatic Vital Signs  Vitals:    11/07/23 2016 11/07/23 2100   BP: 117/70 110/72   Pulse: 70 82   Patient Position - Orthostatic VS: Lying Lying       Physical Exam  Vitals and nursing note reviewed. Constitutional:       General: She is not in acute distress. Appearance: Normal appearance. She is well-developed and normal weight. She is not ill-appearing, toxic-appearing or diaphoretic. HENT:      Head: Normocephalic and atraumatic. Right Ear: External ear normal.      Left Ear: External ear normal.      Nose: Nose normal. No congestion or rhinorrhea. Mouth/Throat:      Mouth: Mucous membranes are moist.      Pharynx: Oropharynx is clear. No oropharyngeal exudate or posterior oropharyngeal erythema. Eyes:      General: No scleral icterus. Right eye: No discharge. Left eye: No discharge. Extraocular Movements: Extraocular movements intact. Conjunctiva/sclera: Conjunctivae normal.      Pupils: Pupils are equal, round, and reactive to light. Cardiovascular:      Rate and Rhythm: Normal rate and regular rhythm. Pulses: Normal pulses. Heart sounds: Normal heart sounds. No murmur heard. No friction rub. No gallop. Pulmonary:      Effort: Pulmonary effort is normal. No respiratory distress. Breath sounds: Normal breath sounds. No stridor. No wheezing, rhonchi or rales. Abdominal:      General: Abdomen is flat. Bowel sounds are normal. There is no distension. Palpations: Abdomen is soft. Tenderness: There is no abdominal tenderness.  There is no right CVA tenderness or guarding. Musculoskeletal:         General: No tenderness. Cervical back: Neck supple. Right lower leg: No edema. Left lower leg: No edema. Skin:     General: Skin is warm and dry. Capillary Refill: Capillary refill takes less than 2 seconds. Coloration: Skin is not jaundiced or pale. Findings: No rash. Neurological:      General: No focal deficit present. Mental Status: She is alert and oriented to person, place, and time. Cranial Nerves: No cranial nerve deficit. Sensory: No sensory deficit. Motor: No weakness. Psychiatric:         Mood and Affect: Mood normal.         Behavior: Behavior normal.         ED Medications  Medications   ibuprofen (MOTRIN) tablet 600 mg (600 mg Oral Given 11/7/23 2121)       Diagnostic Studies  Results Reviewed       None                   No orders to display         Procedures  Procedures      ED Course                                       Medical Decision Making  Patient is a 66-year-old female who presents with allergic reaction. Patient's history consistent with a mild allergic reaction, she currently has no physical evidence of having had this reaction and it appears to have resolved. No indication that patient has an anaphylactic reaction, no indication for further observation at this time. We will add gadolinium to patient's allergy list.  Motrin for headache. Will prescribe EpiPen, return precautions given, all questions answered. Risk  Prescription drug management. Disposition  Final diagnoses:    Allergic reaction, initial encounter     Time reflects when diagnosis was documented in both MDM as applicable and the Disposition within this note       Time User Action Codes Description Comment    11/7/2023  9:26 PM Sonam Ramirez Add [T78.40XA] Allergic reaction, initial encounter           ED Disposition       ED Disposition   Discharge    Condition   Stable    Date/Time   Tue Nov 7, 2023  9:26 PM    Comment   Colleen Alcaraz discharge to home/self care. Follow-up Information       Follow up With Specialties Details Why Contact Info Additional Information    Burgess Kodak MD Family Medicine Call  As needed [phone] Emergency Department Emergency Medicine Go to  If symptoms worsen or if you have any other specific concerns [address] [phone]  [facility] Emergency Department, [address] [facility]            Discharge Medication List as of 11/7/2023  9:28 PM        START taking these medications    Details   EPINEPHrine (EPIPEN) 0.3 mg/0.3 mL SOAJ Inject 0.3 mL (0.3 mg total) into a muscle once for 1 dose, Starting Tue 11/7/2023, Normal           CONTINUE these medications which have NOT CHANGED    Details   chlorpheniramine (CHLOR-TRIMETON) 2 MG/5ML syrup Take 5 mL (2 mg total) by mouth every 4 (four) hours as needed for allergies, Starting Thu 12/8/2022, No Print      ondansetron (ZOFRAN) 4 mg tablet Take 1 tablet (4 mg total) by mouth every 8 (eight) hours as needed for nausea or vomiting, Starting Wed 5/31/2023, Normal           No discharge procedures on file. PDMP Review       None             ED Provider  Attending physically available and evaluated Viola Cain. I managed the patient along with the ED Attending.     Electronically Signed by           Tao Wing MD  11/08/23 9116

## 2023-11-08 NOTE — NURSING NOTE
Called patient to follow up with possible IV of Gadolinium contrast reaction during MRI done on 11/7/23. Spoke with patient who stated " I am doing well, the itching and hives have gone away." No issues brought about resulting from test. Patient made aware to inform providers and technologist in the future if contrast is needed of potential allergic reaction, patient expressed understanding. Information on chart allergy also updated.

## 2023-11-10 ENCOUNTER — TELEPHONE (OUTPATIENT)
Dept: FAMILY MEDICINE CLINIC | Facility: CLINIC | Age: 23
End: 2023-11-10

## 2023-11-10 DIAGNOSIS — R10.13 EPIGASTRIC PAIN: Primary | ICD-10-CM

## 2023-11-10 RX ORDER — ONDANSETRON 4 MG/1
4 TABLET, ORALLY DISINTEGRATING ORAL EVERY 8 HOURS PRN
Qty: 30 TABLET | Refills: 1 | Status: SHIPPED | OUTPATIENT
Start: 2023-11-10

## 2023-11-10 RX ORDER — FAMOTIDINE 40 MG/1
40 TABLET, FILM COATED ORAL
Qty: 30 TABLET | Refills: 1 | Status: SHIPPED | OUTPATIENT
Start: 2023-11-10

## 2023-11-10 NOTE — TELEPHONE ENCOUNTER
Ordered pepcid along with zofran as needed for nausea. If not improving recommending follow-up with us or with GI    ----- Message from Amanda Levin sent at 11/10/2023 10:38 AM EST -----  Regarding: MRI Contrast Reaction  Contact: [phone]  Review       ----- Message -----  From: Viola Cain  Sent: 11/10/2023   8:06 AM EST  To: [facility]  Subject: MRI Contrast Reaction                            I had an MRI w contrast on Tuesday evening and had a mild allergic reaction to it that I was monitored in the hospital for for a few hours, but since then I have been having stomach problems. I couldnt eat anything on Wednesday without going to the bathroom 10 minutes later, and got sick about 5 times. Yesterday and today, I have been able to eat, but am still experiencing nausea and stomach pain. Is there anything I can take OTC to help settle the stomach/do you think the contrast can cause stomach issues?

## 2023-11-16 ENCOUNTER — OFFICE VISIT (OUTPATIENT)
Dept: PHYSICAL THERAPY | Facility: REHABILITATION | Age: 23
End: 2023-11-16
Payer: COMMERCIAL

## 2023-11-16 DIAGNOSIS — M79.632 LEFT FOREARM PAIN: Primary | ICD-10-CM

## 2023-11-16 PROCEDURE — 97112 NEUROMUSCULAR REEDUCATION: CPT | Performed by: PHYSICAL THERAPIST

## 2023-11-16 PROCEDURE — 97140 MANUAL THERAPY 1/> REGIONS: CPT | Performed by: PHYSICAL THERAPIST

## 2023-11-16 NOTE — PROGRESS NOTES
Daily Note     Today's date: 2023  Patient name: Makeda Reis  : 2000  MRN: [id number]  Referring provider: Samy Skaggs  Dx:   Encounter Diagnosis     ICD-10-CM    1. Left forearm pain  M79.632           Start Time: 174  Stop Time:   Total time in clinic (min): 43 minutes    Subjective: Reports she was sick last week so she did not do her exercises much. Is feeling alright. Objective: See treatment diary below      Assessment: Tolerated treatment well. Patient exhibited good technique with therapeutic exercises and would benefit from continued PT      Plan: Continue per plan of care.       Precautions: standard    Manuals            ECU STM 9' 10'                                     Assessment  QD 2'           Neuro Re-Ed            Wrist iso  HEP 5x10"            Wrist ext with ulnar dev 3x OTB HEP            Finger web   3x10 with rot yllw           Hammer rotations  2x10           Supination/pronation  Red therabar 3x10           Holds from pull up bar  10x10"           KB hold with rot  30x 20lb KB           HEP/education 8'            Ther Ex                                                                                                                     Ther Activity                                       Gait Training                                       Modalities

## 2023-11-28 ENCOUNTER — TELEMEDICINE (OUTPATIENT)
Dept: FAMILY MEDICINE CLINIC | Facility: CLINIC | Age: 23
End: 2023-11-28
Payer: COMMERCIAL

## 2023-11-28 DIAGNOSIS — U07.1 COVID-19: Primary | ICD-10-CM

## 2023-11-28 DIAGNOSIS — R09.81 NASAL CONGESTION: ICD-10-CM

## 2023-11-28 PROCEDURE — 99213 OFFICE O/P EST LOW 20 MIN: CPT | Performed by: STUDENT IN AN ORGANIZED HEALTH CARE EDUCATION/TRAINING PROGRAM

## 2023-11-28 RX ORDER — FLUTICASONE PROPIONATE 50 MCG
1 SPRAY, SUSPENSION (ML) NASAL DAILY
Qty: 16 G | Refills: 0 | Status: SHIPPED | OUTPATIENT
Start: 2023-11-28

## 2023-11-28 NOTE — PROGRESS NOTES
COVID-19 Outpatient Progress Note    Assessment/Plan:    Problem List Items Addressed This Visit    None  Visit Diagnoses     COVID-19    -  Primary    Nasal congestion        Relevant Medications    fluticasone (FLONASE) 50 mcg/act nasal spray         Patient appears to have residual COVID symptoms but overall getting better. Patient has been fever free however continues to have congestion and malaise. Recommended continued symptomatic management at this time. Advised patient to follow-up with office if symptoms worsen or fail to improve. Disposition:     Discussed symptom directed medication options with patient. Discussed vitamin D, vitamin C, and/or zinc supplementation with patient. I have spent a total time of 20 minutes on the day of the encounter for this patient including       Encounter provider: Phu Kc MD     Provider located at: [address] [facility]  [address] [facility] [address]     Recent Visits  No visits were found meeting these conditions. Showing recent visits within past 7 days and meeting all other requirements  Today's Visits  Date Type Provider Dept   11/28/23 Telemedicine Phu Kc MD [facility] today's visits and meeting all other requirements  Future Appointments  No visits were found meeting these conditions. Showing future appointments within next 150 days and meeting all other requirements     This virtual check-in was done via [address] [place] [address] and patient was informed that this is a secure, HIPAA-compliant platform. She agrees to proceed. Patient agrees to participate in a virtual check in via telephone or video visit instead of presenting to the office to address urgent/immediate medical needs. Patient is aware this is a billable service. She acknowledged consent and understanding of privacy and security of the video platform.  The patient has agreed to participate and understands they can discontinue the visit at any time. After connecting through [facility], the patient was identified by name and date of birth. Brian Hughes was informed that this was a telemedicine visit and that the exam was being conducted confidentially over secure lines. Brian Hughes acknowledged consent and understanding of privacy and security of the telemedicine visit. I informed the patient that I have reviewed her record in Epic and presented the opportunity for her to ask any questions regarding the visit today. The patient agreed to participate. Verification of patient location:  Patient is located in the following state in which I hold an active license: [place]    Subjective:   Brian Hughes is a 21 y.o. female who has been screened for COVID-19. Symptom change since last report: improving. Patient's symptoms include malaise, nasal congestion, sore throat, anosmia, loss of taste, cough, chest tightness and headache. Patient denies fever, rhinorrhea, shortness of breath, nausea, vomiting, diarrhea and myalgias. - Date of symptom onset: 11/22/2023  - Date of positive COVID-19 test: 11/24/2023. Type of test: PCR. COVID-19 vaccination status: Fully vaccinated with booster    No results found for: "Karyn Paigetz", "[address] [facility]", "[facility]", "[facility]", "[facility]", "[address] [facility]"    Review of Systems   Constitutional:  Negative for fever. HENT:  Positive for congestion and sore throat. Negative for rhinorrhea. Respiratory:  Positive for cough and chest tightness. Negative for shortness of breath. Gastrointestinal:  Negative for diarrhea, nausea and vomiting. Musculoskeletal:  Negative for myalgias. Neurological:  Positive for headaches.      Current Outpatient Medications on File Prior to Visit   Medication Sig   • chlorpheniramine (CHLOR-TRIMETON) 2 MG/5ML syrup Take 5 mL (2 mg total) by mouth every 4 (four) hours as needed for allergies   • EPINEPHrine (EPIPEN) 0.3 mg/0.3 mL SOAJ Inject 0.3 mL (0.3 mg total) into a muscle once for 1 dose   • famotidine (PEPCID) 40 MG tablet Take 1 tablet (40 mg total) by mouth daily at bedtime   • ondansetron (ZOFRAN) 4 mg tablet Take 1 tablet (4 mg total) by mouth every 8 (eight) hours as needed for nausea or vomiting   • ondansetron (ZOFRAN-ODT) 4 mg disintegrating tablet Take 1 tablet (4 mg total) by mouth every 8 (eight) hours as needed for nausea or vomiting       Objective: There were no vitals taken for this visit. Physical Exam  Constitutional:       General: She is not in acute distress. Appearance: Normal appearance. She is not ill-appearing. HENT:      Head: Normocephalic and atraumatic. Neurological:      Mental Status: She is alert and oriented to person, place, and time.        Frankie Melara MD

## 2023-11-30 ENCOUNTER — TELEPHONE (OUTPATIENT)
Dept: FAMILY MEDICINE CLINIC | Facility: CLINIC | Age: 23
End: 2023-11-30

## 2023-11-30 NOTE — TELEPHONE ENCOUNTER
Pt states she tested Covid + 1 week ago. Pt's cough is getting better but she states chest tightness is worse. Pt wants to know if is ok to use Flonase and Flovent at the same time. Pt also states she's having some SOB. Pt didn't want to schedule a virtual appt at the moment. Pt was advised to go an urgent care or the ER if Sx worsen. Pt stated she understood. Please review and advise thank you. PAIN

## 2023-12-04 ENCOUNTER — OFFICE VISIT (OUTPATIENT)
Dept: PHYSICAL THERAPY | Facility: REHABILITATION | Age: 23
End: 2023-12-04
Payer: COMMERCIAL

## 2023-12-04 DIAGNOSIS — M79.632 LEFT FOREARM PAIN: Primary | ICD-10-CM

## 2023-12-04 PROCEDURE — 97112 NEUROMUSCULAR REEDUCATION: CPT | Performed by: PHYSICAL THERAPIST

## 2023-12-04 PROCEDURE — 97140 MANUAL THERAPY 1/> REGIONS: CPT | Performed by: PHYSICAL THERAPIST

## 2023-12-04 NOTE — PROGRESS NOTES
Daily Note     Today's date: 2023  Patient name: Maggy Burgos  : 2000  MRN: [id number]  Referring provider: Sravani Kiran  Dx:   Encounter Diagnosis     ICD-10-CM    1. Left forearm pain  M79.632           Start Time:   Stop Time:   Total time in clinic (min): 34 minutes    Subjective: Reports she had Covid for 2 weeks so was unable to do too many exercises. Objective: See treatment diary below      Assessment: Tolerated treatment well. Patient exhibited good technique with therapeutic exercises and would benefit from continued PT      Plan: Continue per plan of care. Follow up in 2 weeks if needed.       Precautions: standard    Manuals           ECU STM 9' 10' 9'                                    Assessment  QD 2'           Neuro Re-Ed           Wrist iso  HEP 5x10"            Wrist ext with ulnar dev 3x OTB HEP            Finger web   3x10 with rot yllw 3x10 with rot yllw          Hammer rotations  2x10 3x10          Supination/pronation  Red therabar 3x10 Red therband 18x ea          Holds from pull up bar  10x10" With twist 12x          KB hold with rot  30x 20lb KB Claw  7lb DB 20x          HEP/education 8'            Ther Ex                                                                                                                     Ther Activity                                       Gait Training                                       Modalities

## 2023-12-15 ENCOUNTER — OFFICE VISIT (OUTPATIENT)
Dept: NEUROLOGY | Facility: CLINIC | Age: 23
End: 2023-12-15
Payer: COMMERCIAL

## 2023-12-15 ENCOUNTER — VBI (OUTPATIENT)
Dept: ADMINISTRATIVE | Facility: OTHER | Age: 23
End: 2023-12-15

## 2023-12-15 VITALS
BODY MASS INDEX: 23.04 KG/M2 | WEIGHT: 130 LBS | DIASTOLIC BLOOD PRESSURE: 70 MMHG | HEIGHT: 63 IN | SYSTOLIC BLOOD PRESSURE: 110 MMHG | HEART RATE: 80 BPM

## 2023-12-15 DIAGNOSIS — G43.009 MIGRAINE WITHOUT AURA AND WITHOUT STATUS MIGRAINOSUS, NOT INTRACTABLE: Primary | ICD-10-CM

## 2023-12-15 PROCEDURE — 99214 OFFICE O/P EST MOD 30 MIN: CPT | Performed by: STUDENT IN AN ORGANIZED HEALTH CARE EDUCATION/TRAINING PROGRAM

## 2023-12-15 NOTE — PROGRESS NOTES
[facility] Neurology Consult  PATIENT:  Colleen Alcaraz  MRN:  [id number]  :  2000  DATE OF SERVICE:  2024  REFERRED BY: No ref. provider found  PMD: Clive Garcia MD    Assessment/Plan:     Colleen Alcaraz is a very pleasant 24 y.o. female with a past medical history that includes referred here for evaluation of multiple complaints.     Vertigo  Visual disturbance  POTS  Autonomic dysfunction  -vestibular testing negative today. Neuro exam overall unremarkable  -presumptive diagnosis of POTS per pt based on previous testing from [facility]. Will request records. Could consider repeat tilt table testing in the future   -recommended continuation of using compression socks and maintaining hydration >2L daily  -consider establishing with Dr. Xiang Carroll or Dr. Raghu Moses at [place] if interested in autonomic w/u    Migraines without aura  Preventative:  - we discussed headache hygiene and lifestyle factors that may improve headaches, including sleep hygiene, hydration goal at least 60-80 oz water daily, limiting caffeine intake and not skipping meals  - as she is only having about 1-2 migraines per month, would defer preventative management at this time    Abortive:  - discussed not taking over-the-counter or prescription pain medications more than 3 days per week to prevent medication overuse/rebound headache    CC:   Migraines, POTS    History of Present Illness:     23 y.o. female with a past medical history that includes referred here for evaluation of multiple complaints.     Feels constant sense of motion. When closes her eyes she has poor proprioception. Often loses balance in wide-open areas. Worsened by wide open spaces, glass, long hallways, highway  Has hx migraines, multiple concussions  Majority of symptoms started 9 years ago. Started 2 years after a concussion resulting in LOC  Has had multiple mris, pet scans in the past  Not associated with migraines will have  episodes of LOC for a second or two  Somewhat due to pots    eegs in the past including 48 hr eeg monitoring    Migraines - every 2-3 months gets a migraine. Visual aura, nausea mostly. Not followed by head pain.     POTS - diagnosed several years ago by a cardiologist              - fast changes in position including squats, burpees                compression socks are helpful    Water - 2 cups tea, water bottle 16 oz x4. 2-3 cups of tea at night    florinef - tried a long time ago    PT - balance exercises   [facility] - tilt table testing    Concussion in march 2022 - within a few weeks had improvement with everything except for tinnitus  Pet scan - with concern for possible PANDAS    Past Medical History:     Past Medical History:   Diagnosis Date    Allergic 1/1/2008    Annual physical exam     Depression 06/1/2014    Migraine August 2014    I had severe migraines in high school, I rarely get migraines now but have started getting ocular migraines in the past year    Polycystic ovary syndrome 2016    ultrasound    Visual impairment 1/1/2015    Visual-spatial processing dysfunction and difficulty with convergence       Patient Active Problem List   Diagnosis    PCOS (polycystic ovarian syndrome)    POTS (postural orthostatic tachycardia syndrome)    Reactive hypoglycemia    Annual physical exam    Allergic rhinitis    Reactive airway disease without complication    PANDAS (pediatric autoimmune neuropsychiatric disease associated with streptococcal infection)  (HCC)    Pain, dental    Impacted third molar tooth       Medications:      Current Outpatient Medications   Medication Sig Dispense Refill    Blood Glucose Monitoring Suppl (OneTouch Verio) w/Device KIT Use in the morning (Patient taking differently: Use if needed) 1 kit 1    chlorpheniramine (CHLOR-TRIMETON) 2 MG/5ML syrup Take 5 mL (2 mg total) by mouth every 4 (four) hours as needed for allergies 118 mL 0    CHLORPHENIRAMINE MALEATE CR PO Take by mouth       escitalopram (LEXAPRO) 5 mg tablet Take 1 tablet (5 mg total) by mouth daily 30 tablet 1    glucose blood (OneTouch Verio) test strip Use as instructed (Patient taking differently: if needed Use as instructed) 25 each 3    OneTouch Delica Lancets 33G MISC Use daily (Patient taking differently: Use if needed) 100 each 1     No current facility-administered medications for this visit.        Allergies:      Allergies   Allergen Reactions    Albuterol Anxiety, Confusion, Cough, Dizziness, Hyperactivity, Lightheadedness, Palpitations, Shortness Of Breath, Tachycardia and Wheezing    Fruit Extracts Cough, Dizziness, Eye Swelling, Hives, Itching, Lip Swelling, Nasal Congestion, Palpitations, Rash, Shortness Of Breath, Sneezing, Swelling, Throat Swelling and Wheezing    Nuts - Food Allergy Cough, Hives, Itching, Palpitations, Shortness Of Breath and Swelling    Other Cough, Hives, Itching, Lip Swelling, Rash, Shortness Of Breath, Sneezing and Throat Swelling    Passion Fruit Flavor - Food Allergy Anaphylaxis     All Fruits except Lemon/lime    Tree Extract Anaphylaxis     Tree nuts    Vegetable Extract Cough, Hives, Itching, Lip Swelling, Rash, Shortness Of Breath and Wheezing    Gadolinium Hives     Developed small area of hives on her right flank and itchy eyes a few minutes after MRI contrast  Will need allergy prep for future MRI with contrast. SEDRICK RN 11/8/23    Mushroom Extract Complex - Food Allergy Diarrhea and GI Intolerance    Raw Vegetable - Food Allergy Itching and Swelling    Doxycycline Dizziness, Drowsiness, Fatigue, Fever, Headache and Rash    Iodine - Food Allergy Itching and Rash       Family History:     Family History   Problem Relation Age of Onset    Mental illness Father     Depression Father     Anxiety disorder Father     OCD Father     Seizures Brother         PANDAS/Auto-immune incephalitis    Autoimmune disease Brother         Autoimmune incephalitis and PANDAS    OCD Brother     Psychosis  "Brother     Vision loss Maternal Aunt     Thyroid disease unspecified Maternal Grandmother     Osteoporosis Paternal Grandmother     Diabetes type II Family        Social History:       Social History     Socioeconomic History    Marital status: Single     Spouse name: Not on file    Number of children: Not on file    Years of education: Not on file    Highest education level: Not on file   Occupational History    Not on file   Tobacco Use    Smoking status: Never    Smokeless tobacco: Never   Vaping Use    Vaping status: Never Used   Substance and Sexual Activity    Alcohol use: Not Currently     Comment: social    Drug use: Never    Sexual activity: Not Currently     Partners: Male     Birth control/protection: Abstinence   Other Topics Concern    Not on file   Social History Narrative    Not on file     Social Determinants of Health     Financial Resource Strain: Low Risk  (2/23/2024)    Overall Financial Resource Strain (CARDIA)     Difficulty of Paying Living Expenses: Not very hard   Food Insecurity: No Food Insecurity (2/23/2024)    Hunger Vital Sign     Worried About Running Out of Food in the Last Year: Never true     Ran Out of Food in the Last Year: Never true   Transportation Needs: Unknown (2/23/2024)    PRAPARE - Transportation     Lack of Transportation (Medical): Patient declined     Lack of Transportation (Non-Medical): No   Physical Activity: Not on file   Stress: Not on file   Social Connections: Not on file   Intimate Partner Violence: Not on file   Housing Stability: Not on file         Objective:   /70 (BP Location: Right arm, Patient Position: Sitting, Cuff Size: Adult)   Pulse 80   Ht 5' 3\" (1.6 m)   Wt 59 kg (130 lb)   BMI 23.03 kg/m²     General: Patient is not in any acute/apparent distress, well nourished, well developed and cooperative.   HEENT: normocephalic, atraumatic, moist membranes  Neck: supple  Extremities: no edema noted   Skin: no lesions or rash  Musculosketal: no " bony abnormalities    Neurologic Examination:   Mental status: alert, awake, oriented X 3 and following commands.     Speech/Language: Speech is fluent without any dysarthria, no aphasia noted, can name, comprehension intact    Cranial Nerves:   CN I: smell not tested  CN II: Visual fields full to confrontation  CN III, IV, VI: Extraocular movements intact bilaterally. Pupils equal round and reactive to light bilaterally.  CN V: Facial sensation is normal.  CN VII: Full and symmetric facial movement.  CN VIII: Hearing is normal.  CN IX, X: Palate elevates symmetrically.   CN XI: Shoulder shrug strength is normal.  CN XII: Tongue midline without atrophy or fasciculations.    Motor:   Strength 5/5 in all 4 extremities. Bulk/tone - normal.  Fasiculations - none    Sensory:   Sensation intact to soft touch, vibration and pinprick in all 4 extremities.    Cerebellar:   Finger-to-nose intact, normal heel to shin.    Reflexes: 2+ in all 4 extremities  Pathologic reflexes - babinski reflex negative    Gait:   Normal gait, able to tandem walk, Romberg sign negative     Review of Systems:     ROS:    Review of Systems   Constitutional:  Negative for appetite change, fatigue and fever.   HENT: Negative.  Negative for hearing loss, tinnitus, trouble swallowing and voice change.    Eyes: Negative.  Negative for photophobia, pain and visual disturbance.   Respiratory: Negative.  Negative for shortness of breath.    Cardiovascular: Negative.  Negative for palpitations.   Gastrointestinal: Negative.  Negative for nausea and vomiting.   Endocrine: Negative.  Negative for cold intolerance.   Genitourinary: Negative.  Negative for dysuria, frequency and urgency.   Musculoskeletal:  Negative for back pain, gait problem, myalgias and neck pain.   Skin: Negative.  Negative for rash.   Allergic/Immunologic: Negative.    Neurological:  Positive for headaches. Negative for dizziness, tremors, seizures, syncope, facial asymmetry, speech  difficulty, weakness, light-headedness and numbness.   Hematological: Negative.  Does not bruise/bleed easily.   Psychiatric/Behavioral: Negative.  Negative for confusion, hallucinations and sleep disturbance.      I have spent a total time of 34 minutes on 05/20/24 in caring for this patient including Risks and benefits of tx options, Instructions for management, Patient and family education, Risk factor reductions, Impressions, Documenting in the medical record, Reviewing / ordering tests, medicine, procedures  , and Obtaining or reviewing history  .

## 2023-12-15 NOTE — PATIENT INSTRUCTIONS
Patient Instructions:  -follow up with neurology as needed for any new or worsening symptoms  -consider referral for autonomic testing in the future - Dr. Xiang Carroll or Dr. Raghu Moses at [place]

## 2023-12-18 ENCOUNTER — TELEPHONE (OUTPATIENT)
Dept: FAMILY MEDICINE CLINIC | Facility: CLINIC | Age: 23
End: 2023-12-18

## 2023-12-18 NOTE — TELEPHONE ENCOUNTER
Patient called and is complaining of shortness of breath and being fatigue since getting over COVID. She is asking if it is ok to use her inhaler previously prescribed until seen. Please advise.

## 2023-12-20 ENCOUNTER — OFFICE VISIT (OUTPATIENT)
Dept: FAMILY MEDICINE CLINIC | Facility: CLINIC | Age: 23
End: 2023-12-20
Payer: COMMERCIAL

## 2023-12-20 VITALS
SYSTOLIC BLOOD PRESSURE: 103 MMHG | OXYGEN SATURATION: 98 % | HEIGHT: 63 IN | HEART RATE: 97 BPM | WEIGHT: 130 LBS | TEMPERATURE: 99.9 F | BODY MASS INDEX: 23.04 KG/M2 | DIASTOLIC BLOOD PRESSURE: 52 MMHG

## 2023-12-20 DIAGNOSIS — J45.909 REACTIVE AIRWAY DISEASE WITHOUT COMPLICATION, UNSPECIFIED ASTHMA SEVERITY, UNSPECIFIED WHETHER PERSISTENT: Primary | ICD-10-CM

## 2023-12-20 DIAGNOSIS — D89.89 PANDAS (PEDIATRIC AUTOIMMUNE NEUROPSYCHIATRIC DISEASE ASSOCIATED WITH STREPTOCOCCAL INFECTION): ICD-10-CM

## 2023-12-20 DIAGNOSIS — B94.8 PANDAS (PEDIATRIC AUTOIMMUNE NEUROPSYCHIATRIC DISEASE ASSOCIATED WITH STREPTOCOCCAL INFECTION): ICD-10-CM

## 2023-12-20 PROCEDURE — 99214 OFFICE O/P EST MOD 30 MIN: CPT | Performed by: FAMILY MEDICINE

## 2023-12-20 RX ORDER — FLUTICASONE PROPIONATE 220 UG/1
2 AEROSOL, METERED RESPIRATORY (INHALATION) 2 TIMES DAILY
Qty: 36 G | Refills: 1 | Status: SHIPPED | OUTPATIENT
Start: 2023-12-20

## 2023-12-20 RX ORDER — AMOXICILLIN 500 MG/1
500 TABLET, FILM COATED ORAL 3 TIMES DAILY
Qty: 21 TABLET | Refills: 0 | Status: SHIPPED | OUTPATIENT
Start: 2023-12-20 | End: 2023-12-27

## 2023-12-22 NOTE — PROGRESS NOTES
Assessment/Plan:    24 y/o woman with: RAD in the setting of pt with PANDAS hx. Will give antibiotic and flovent. Discussed supportive care and return parameters.     No problem-specific Assessment & Plan notes found for this encounter.       Diagnoses and all orders for this visit:    Reactive airway disease without complication, unspecified asthma severity, unspecified whether persistent  -     fluticasone (Flovent HFA) 220 mcg/act inhaler; Inhale 2 puffs 2 (two) times a day Rinse mouth after use.  -     amoxicillin (AMOXIL) 500 MG tablet; Take 1 tablet (500 mg total) by mouth 3 (three) times a day for 7 days    PANDAS (pediatric autoimmune neuropsychiatric disease associated with streptococcal infection)           Subjective:     Chief Complaint   Patient presents with    Sore Throat     Pt complains of sob sore throat not able to taste anything very weak and tired very cold all the time unable to get warm no fever needs blood work suggestions due to it coming from a research lab should she use an inhaler or something else should see see a pulmonologist jh         Patient ID: Colleen Alcaraz is a 23 y.o. female.    Patient is a 24 y/o woman who presents for follow-up on RAD in the setting of pt with PANDAS hx. No fevers chills nausea or vomiting. No productive sputum.    Sore Throat   Associated symptoms include shortness of breath.       The following portions of the patient's history were reviewed and updated as appropriate: allergies, current medications, past family history, past medical history, past social history, past surgical history and problem list.    Review of Systems   Constitutional: Negative.    HENT:  Positive for sore throat.    Eyes: Negative.    Respiratory:  Positive for shortness of breath and wheezing.    Cardiovascular: Negative.    Gastrointestinal: Negative.    Endocrine: Negative.    Genitourinary: Negative.    Musculoskeletal: Negative.    Allergic/Immunologic: Negative.   "  Neurological: Negative.    Hematological: Negative.    Psychiatric/Behavioral: Negative.     All other systems reviewed and are negative.        Objective:      /52 (BP Location: Left arm, Patient Position: Sitting, Cuff Size: Standard)   Pulse 97   Temp 99.9 °F (37.7 °C) (Temporal)   Ht 5' 3\" (1.6 m)   Wt 59 kg (130 lb)   SpO2 98%   BMI 23.03 kg/m²          Physical Exam  Constitutional:       Appearance: She is well-developed.   HENT:      Head: Atraumatic.      Right Ear: External ear normal.      Left Ear: External ear normal.   Eyes:      Conjunctiva/sclera: Conjunctivae normal.      Pupils: Pupils are equal, round, and reactive to light.   Cardiovascular:      Rate and Rhythm: Normal rate and regular rhythm.      Heart sounds: Normal heart sounds.   Pulmonary:      Effort: Pulmonary effort is normal. No respiratory distress.      Breath sounds: Wheezing present.   Abdominal:      General: There is no distension.      Palpations: Abdomen is soft.      Tenderness: There is no abdominal tenderness. There is no guarding or rebound.   Musculoskeletal:         General: Normal range of motion.      Cervical back: Normal range of motion.   Skin:     General: Skin is warm and dry.   Neurological:      Mental Status: She is alert and oriented to person, place, and time.      Cranial Nerves: No cranial nerve deficit.   Psychiatric:         Behavior: Behavior normal.         Thought Content: Thought content normal.         Judgment: Judgment normal.         "

## 2023-12-23 ENCOUNTER — APPOINTMENT (EMERGENCY)
Dept: RADIOLOGY | Facility: HOSPITAL | Age: 23
End: 2023-12-23
Payer: COMMERCIAL

## 2023-12-23 ENCOUNTER — HOSPITAL ENCOUNTER (EMERGENCY)
Facility: HOSPITAL | Age: 23
Discharge: HOME | End: 2023-12-24
Attending: EMERGENCY MEDICINE
Payer: COMMERCIAL

## 2023-12-23 VITALS
SYSTOLIC BLOOD PRESSURE: 138 MMHG | TEMPERATURE: 98.6 F | DIASTOLIC BLOOD PRESSURE: 85 MMHG | HEART RATE: 104 BPM | OXYGEN SATURATION: 98 % | RESPIRATION RATE: 22 BRPM

## 2023-12-23 DIAGNOSIS — J40 BRONCHITIS: Primary | ICD-10-CM

## 2023-12-23 PROCEDURE — 99285 EMERGENCY DEPT VISIT HI MDM: CPT

## 2023-12-23 PROCEDURE — 99284 EMERGENCY DEPT VISIT MOD MDM: CPT | Performed by: EMERGENCY MEDICINE

## 2023-12-23 PROCEDURE — 93005 ELECTROCARDIOGRAM TRACING: CPT

## 2023-12-23 PROCEDURE — 71046 X-RAY EXAM CHEST 2 VIEWS: CPT

## 2023-12-24 LAB
ATRIAL RATE: 85 BPM
FLUAV RNA RESP QL NAA+PROBE: NEGATIVE
FLUBV RNA RESP QL NAA+PROBE: NEGATIVE
P AXIS: 74 DEGREES
PR INTERVAL: 166 MS
QRS AXIS: 82 DEGREES
QRSD INTERVAL: 84 MS
QT INTERVAL: 374 MS
QTC INTERVAL: 445 MS
RSV RNA RESP QL NAA+PROBE: POSITIVE
SARS-COV-2 RNA RESP QL NAA+PROBE: NEGATIVE
T WAVE AXIS: 58 DEGREES
VENTRICULAR RATE: 85 BPM

## 2023-12-24 PROCEDURE — 0241U HB NFCT DS VIR RESP RNA 4 TRGT: CPT

## 2023-12-24 NOTE — ED ATTENDING ATTESTATION
12/23/2023  IYuri MD, saw and evaluated the patient. I have discussed the patient with the resident/non-physician practitioner and agree with the resident's/non-physician practitioner's findings, Plan of Care, and MDM as documented in the resident's/non-physician practitioner's note, except where noted. All available labs and Radiology studies were reviewed.  I was present for key portions of any procedure(s) performed by the resident/non-physician practitioner and I was immediately available to provide assistance.       At this point I agree with the current assessment done in the Emergency Department.  I have conducted an independent evaluation of this patient a history and physical is as follows:    ED Course  ED Course as of 12/24/23 0647   Sat Dec 23, 2023   2329 Per resident h&p 24 YO F h/o RAD diagnosed with covid 5 weeks ago; increased work of breathing over the last 3 days; using MDI to treat respiratory symptoms saw family medicine who started her on amoxicillin; apap and motrin for fevers no fevers for last 2 days; no wheezing; no abdominal pain, O: lungs no wheezing  L/min; I/P CXR; viral screen     Emergency Department Note- Colleen Alcaraz 23 y.o. female MRN: [id number]    Unit/Bed#: ED 19 Encounter: [id number]    Colleen Alcaraz is a 23 y.o. female who presents with   Chief Complaint   Patient presents with    Shortness of Breath     PT c/o SOB for about 5 weeks. PT uses steroid inhaler which has been helping. Starting today the inhaler has not been helping. PT dx covid several weeks ago and started abx yesterday.          History of Present Illness   HPI:  Colleen Alcaraz is a 23 y.o. female who presents for evaluation of:  Increased work of breathing over the last 3 days.  Patient was seen by her primary care physician 3 days ago who started her on amoxicillin to treat a lung infection.  Patient has been giving herself respiratory treatments using her  metered-dose inhaler.  Patient has been taking acetaminophen and ibuprofen for fevers.  Patient has not had a fever over the last 48 hours.  Patient does not smoke cigarettes.  Patient uses a corticosteroid inhaler which helps treat her reactive airway disease but has not treated her symptoms over the last several days.  Patient had a recent COVID infection several weeks ago.    Review of Systems   Constitutional:  Negative for fatigue and fever.   HENT:  Negative for congestion and sore throat.    Respiratory:  Positive for cough, shortness of breath and wheezing.    Cardiovascular:  Negative for chest pain and palpitations.   Gastrointestinal:  Negative for abdominal pain and nausea.   Genitourinary:  Negative for flank pain and frequency.   Neurological:  Negative for light-headedness and headaches.   Psychiatric/Behavioral:  Negative for dysphoric mood and hallucinations.    All other systems reviewed and are negative.      Historical Information   Past Medical History:   Diagnosis Date    Allergic 1/1/2008    Depression 06/1/2014    Migraine August 2014    I had severe migraines in high school, I rarely get migraines now but have started getting ocular migraines in the past year    Polycystic ovary syndrome 2016    ultrasound    Visual impairment 1/1/2015    Visual-spatial processing dysfunction and difficulty with convergence     History reviewed. No pertinent surgical history.  Social History   Social History     Substance and Sexual Activity   Alcohol Use Not Currently    Comment: social     Social History     Substance and Sexual Activity   Drug Use Never     Social History     Tobacco Use   Smoking Status Never   Smokeless Tobacco Never     Family History:   Family History   Problem Relation Age of Onset    Seizures Brother         PANDAS/Auto-immune incephalitis    Autoimmune disease Brother         Autoimmune incephalitis and PANDAS    OCD Brother     Psychosis Brother     Mental illness Father      Depression Father     Anxiety disorder Father     OCD Father     Vision loss Maternal Aunt        Meds/Allergies   PTA meds:   Prior to Admission Medications   Prescriptions Last Dose Informant Patient Reported? Taking?   amoxicillin (AMOXIL) 500 MG tablet   No No   Sig: Take 1 tablet (500 mg total) by mouth 3 (three) times a day for 7 days   chlorpheniramine (CHLOR-TRIMETON) 2 MG/5ML syrup   No No   Sig: Take 5 mL (2 mg total) by mouth every 4 (four) hours as needed for allergies   fluticasone (FLONASE) 50 mcg/act nasal spray   No No   Si spray into each nostril daily   fluticasone (Flovent HFA) 220 mcg/act inhaler   No No   Sig: Inhale 2 puffs 2 (two) times a day Rinse mouth after use.   ondansetron (ZOFRAN) 4 mg tablet   No No   Sig: Take 1 tablet (4 mg total) by mouth every 8 (eight) hours as needed for nausea or vomiting   Patient not taking: Reported on 2023      Facility-Administered Medications: None     Allergies   Allergen Reactions    Albuterol Anxiety, Confusion, Cough, Dizziness, Hyperactivity, Lightheadedness, Palpitations, Shortness Of Breath, Tachycardia and Wheezing    Fruit Extracts Cough, Dizziness, Eye Swelling, Hives, Itching, Lip Swelling, Nasal Congestion, Palpitations, Rash, Shortness Of Breath, Sneezing, Swelling, Throat Swelling and Wheezing    Nuts - Food Allergy Cough, Hives, Itching, Palpitations, Shortness Of Breath and Swelling    Other Cough, Hives, Itching, Lip Swelling, Rash, Shortness Of Breath, Sneezing and Throat Swelling    Tree Extract Anaphylaxis     Tree nuts    Vegetable Extract Cough, Hives, Itching, Lip Swelling, Rash, Shortness Of Breath and Wheezing    Gadolinium Hives     Developed small area of hives on her right flank and itchy eyes a few minutes after MRI contrast  Will need allergy prep for future MRI with contrast. DRDS RN 23    Doxycycline Dizziness, Drowsiness, Fatigue, Fever, Headache and Rash       Objective   First Vitals:   Blood Pressure:  138/85 (234)  Pulse: 104 (234)  Temperature: 98.6 °F (37 °C) (23)  Temp Source: Oral (23)  Respirations: 22 (23)  SpO2: 98 % (23)    Current Vitals:   Blood Pressure: 138/85 (234)  Pulse: 104 (234)  Temperature: 98.6 °F (37 °C) (23)  Temp Source: Oral (23)  Respirations: 22 (23)  SpO2: 98 % (23)    No intake or output data in the 24 hours ending 23 0647    Invasive Devices       None                   Physical Exam  Vitals and nursing note reviewed.   Constitutional:       General: She is not in acute distress.     Appearance: Normal appearance. She is well-developed.   HENT:      Head: Normocephalic and atraumatic.      Right Ear: External ear normal.      Left Ear: External ear normal.      Nose: Nose normal.      Mouth/Throat:      Pharynx: No oropharyngeal exudate.   Eyes:      Conjunctiva/sclera: Conjunctivae normal.      Pupils: Pupils are equal, round, and reactive to light.   Cardiovascular:      Rate and Rhythm: Normal rate and regular rhythm.   Pulmonary:      Effort: Pulmonary effort is normal. No respiratory distress.   Abdominal:      General: Abdomen is flat. There is no distension.      Palpations: Abdomen is soft.   Musculoskeletal:         General: No deformity. Normal range of motion.      Cervical back: Normal range of motion and neck supple.   Skin:     General: Skin is warm and dry.      Capillary Refill: Capillary refill takes less than 2 seconds.   Neurological:      General: No focal deficit present.      Mental Status: She is alert and oriented to person, place, and time. Mental status is at baseline.      Coordination: Coordination normal.   Psychiatric:         Mood and Affect: Mood normal.         Behavior: Behavior normal.         Thought Content: Thought content normal.         Judgment: Judgment normal.           Medical Decision Makin.  Acute  "viral URI: Viral URI screen for RSV, influenza, and COVID; patient can continue on her corticosteroid MDI for her reactive airway disease.  Chest x-ray to rule out pneumonia.    Recent Results (from the past 36 hour(s))   ECG 12 lead    Collection Time: 12/23/23 10:50 PM   Result Value Ref Range    Ventricular Rate 85 BPM    Atrial Rate 85 BPM    WA Interval 166 ms    QRSD Interval 84 ms    QT Interval 374 ms    QTC Interval 445 ms    P Axis 74 degrees    QRS Axis 82 degrees    T Wave Axis 58 degrees   FLU/RSV/COVID - if FLU/RSV clinically relevant    Collection Time: 12/24/23 12:02 AM    Specimen: Nose; Nares   Result Value Ref Range    SARS-CoV-2 Negative Negative    INFLUENZA A PCR Negative Negative    INFLUENZA B PCR Negative Negative    RSV PCR Positive (A) Negative     XR chest 2 views   ED Interpretation   No acute cardiopulmonary process            Portions of the record may have been created with voice recognition software. Occasional wrong word or \"sound a like\" substitutions may have occurred due to the inherent limitations of voice recognition software.  Read the chart carefully and recognize, using context, where substitutions have occurred.        Critical Care Time  Procedures      "

## 2023-12-27 NOTE — ED PROVIDER NOTES
Final Diagnoses:     1. Bronchitis           Nursing Triage:     Chief Complaint   Patient presents with    Shortness of Breath     PT c/o SOB for about 5 weeks. PT uses steroid inhaler which has been helping. Starting today the inhaler has not been helping. PT dx covid several weeks ago and started abx yesterday.      HPI:   This is a 23 y.o. female presenting for evaluation of shortness of breath.   Relevant past medical history COVID diagnosis, questionable pneumonia, reactive airway disease.  Patient states that her family medicine doctor started her on amoxicillin.  She states that she was having fevers but no fevers currently she states that she came in because the inhaler she feels is not working.  She denies any headache, dizziness, chest pain, N/V/D, abdominal pain, dysuria  ASSESSMENT + PLAN:   Will do flu/RSV/COVID, x-ray and EKG.  Will not give any medications at this time as she is not wheezing.    X-ray unremarkable and tests are negative.  Will discharge at this time with follow-up precautions.  Physical:   Physical Exam  Vitals and nursing note reviewed.   Constitutional:       Appearance: Normal appearance.   HENT:      Head: Normocephalic and atraumatic.      Nose: Nose normal.      Mouth/Throat:      Mouth: Mucous membranes are moist.      Pharynx: No oropharyngeal exudate or posterior oropharyngeal erythema.   Eyes:      Extraocular Movements: Extraocular movements intact.      Conjunctiva/sclera: Conjunctivae normal.      Pupils: Pupils are equal, round, and reactive to light.   Cardiovascular:      Rate and Rhythm: Normal rate and regular rhythm.      Pulses: Normal pulses.      Heart sounds: Normal heart sounds.   Pulmonary:      Effort: Pulmonary effort is normal.      Breath sounds: Normal breath sounds.   Abdominal:      General: Abdomen is flat. There is no distension.      Palpations: Abdomen is soft.      Tenderness: There is no abdominal tenderness.   Musculoskeletal:          "General: Normal range of motion.      Cervical back: Normal range of motion.   Lymphadenopathy:      Cervical: No cervical adenopathy.   Skin:     General: Skin is warm and dry.      Capillary Refill: Capillary refill takes less than 2 seconds.   Neurological:      General: No focal deficit present.      Mental Status: She is alert and oriented to person, place, and time.      Cranial Nerves: No cranial nerve deficit.      Sensory: No sensory deficit.   Psychiatric:         Mood and Affect: Mood normal.         Behavior: Behavior normal.         Vitals:    12/23/23 2244   BP: 138/85   Pulse: 104   Resp: 22   Temp: 98.6 °F (37 °C)   TempSrc: Oral   SpO2: 98%     No results found for: \"POCGLU\"    - There are no obvious limitations to social determinants of care.   - Nursing note reviewed.   - Vitals reviewed.   - Orders placed by myself and/or advanced practitioner / resident.    - Previous chart was reviewed  - No language barrier.   - History obtained from patient.    - There are no limitations to the history obtained:     Past Medical:    has a past medical history of Allergic (1/1/2008), Depression (06/1/2014), Migraine (August 2014), Polycystic ovary syndrome (2016), and Visual impairment (1/1/2015).    Past Surgical:    has no past surgical history on file.    Social:     Social History     Substance and Sexual Activity   Alcohol Use Not Currently    Comment: social     Social History     Tobacco Use   Smoking Status Never   Smokeless Tobacco Never     Social History     Substance and Sexual Activity   Drug Use Never       Echo:   No results found for this or any previous visit.    No results found for this or any previous visit.      Cath:    No results found for this or any previous visit.      Code Status: No Order  Advance Directive and Living Will:      Power of :    POLST:    Medications - No data to display  XR chest 2 views   ED Interpretation   No acute cardiopulmonary process      Final Result "      No acute cardiopulmonary disease.                  Workstation performed: [id number]           Orders Placed This Encounter   Procedures    FLU/RSV/COVID - if FLU/RSV clinically relevant    XR chest 2 views    ECG 12 lead     Labs Reviewed - No data to display  Time reflects when diagnosis was documented in both MDM as applicable and the Disposition within this note       Time User Action Codes Description Comment    12/24/2023 12:13 AM Vitaliy Woodson [J40] Bronchitis           ED Disposition       ED Disposition   Discharge    Condition   Stable    Date/Time   Sun Dec 24, 2023 12:13 AM    Comment   Colleen Alcaraz discharge to home/self care.                   Follow-up Information    None       Discharge Medication List as of 12/24/2023 12:13 AM        CONTINUE these medications which have NOT CHANGED    Details   amoxicillin (AMOXIL) 500 MG tablet Take 1 tablet (500 mg total) by mouth 3 (three) times a day for 7 days, Starting Wed 12/20/2023, Until Wed 12/27/2023, Normal      chlorpheniramine (CHLOR-TRIMETON) 2 MG/5ML syrup Take 5 mL (2 mg total) by mouth every 4 (four) hours as needed for allergies, Starting Thu 12/8/2022, No Print      fluticasone (FLONASE) 50 mcg/act nasal spray 1 spray into each nostril daily, Starting Tue 11/28/2023, Normal      fluticasone (Flovent HFA) 220 mcg/act inhaler Inhale 2 puffs 2 (two) times a day Rinse mouth after use., Starting Wed 12/20/2023, Normal      ondansetron (ZOFRAN) 4 mg tablet Take 1 tablet (4 mg total) by mouth every 8 (eight) hours as needed for nausea or vomiting, Starting Wed 5/31/2023, Normal           No discharge procedures on file.  Prior to Admission Medications   Prescriptions Last Dose Informant Patient Reported? Taking?   amoxicillin (AMOXIL) 500 MG tablet   No No   Sig: Take 1 tablet (500 mg total) by mouth 3 (three) times a day for 7 days   chlorpheniramine (CHLOR-TRIMETON) 2 MG/5ML syrup   No No   Sig: Take 5 mL (2 mg total) by mouth every 4  "(four) hours as needed for allergies   fluticasone (FLONASE) 50 mcg/act nasal spray   No No   Si spray into each nostril daily   fluticasone (Flovent HFA) 220 mcg/act inhaler   No No   Sig: Inhale 2 puffs 2 (two) times a day Rinse mouth after use.   ondansetron (ZOFRAN) 4 mg tablet   No No   Sig: Take 1 tablet (4 mg total) by mouth every 8 (eight) hours as needed for nausea or vomiting   Patient not taking: Reported on 2023      Facility-Administered Medications: None                        Portions of the record may have been created with voice recognition software. Occasional wrong word or \"sound a like\" substitutions may have occurred due to the inherent limitations of voice recognition software. Read the chart carefully and recognize, using context, where substitutions have occurred.       Vitaliy Woodson MD  [id number]    "

## 2024-01-04 ENCOUNTER — TELEPHONE (OUTPATIENT)
Dept: FAMILY MEDICINE CLINIC | Facility: CLINIC | Age: 24
End: 2024-01-04

## 2024-01-04 DIAGNOSIS — J45.909 REACTIVE AIRWAY DISEASE WITHOUT COMPLICATION, UNSPECIFIED ASTHMA SEVERITY, UNSPECIFIED WHETHER PERSISTENT: Primary | ICD-10-CM

## 2024-01-04 RX ORDER — BECLOMETHASONE DIPROPIONATE HFA 80 UG/1
2 AEROSOL, METERED RESPIRATORY (INHALATION) 2 TIMES DAILY
Qty: 31.8 G | Refills: 1 | Status: SHIPPED | OUTPATIENT
Start: 2024-01-04

## 2024-01-04 NOTE — TELEPHONE ENCOUNTER
----- Message from Naren Schwarz sent at 1/4/2024  1:06 PM EST -----  Regarding: Inhaler Discontinuation  Contact: [phone]  Pt needs a different type of inhaler please advise       ----- Message -----  From: Colleen Alcaraz  Sent: 1/4/2024  12:30 PM EST  To: [facility]  Subject: Inhaler Discontinuation                          I was informed by my insurance that the  of my Flovent HFA inhaler is discontinuing the product, and has stopped production as of Jan. 1 2024. I have enough left now, but in the future I will likely need a different perscription. I just called the insurance company and they said the following drugs would be covered as steroid inhalers. Can we confirm which is best and make sure it is only steroid and does NOT include albuterol:  Alvesco  Arnuity ellipta  Asmanax  Pulmicort flexhaler  Qvar

## 2024-01-04 NOTE — TELEPHONE ENCOUNTER
Please see echo       Conclusions      Summary   Ejection fraction is visually estimated at 45%. There was mild global hypokinesis of the left ventricle. Left Ventricular size is Mildly increased . The aortic valve leaflets were not well visualized. Aortic valve appears tricuspid. Aortic valve leaflets are somewhat thickened. Mild aortic regurgitation is noted. Sent in the Qvar, if it is not affordable, let us know again

## 2024-01-15 ENCOUNTER — OFFICE VISIT (OUTPATIENT)
Dept: ENDOCRINOLOGY | Facility: CLINIC | Age: 24
End: 2024-01-15
Payer: COMMERCIAL

## 2024-01-15 VITALS
DIASTOLIC BLOOD PRESSURE: 80 MMHG | WEIGHT: 120.6 LBS | HEIGHT: 63 IN | HEART RATE: 80 BPM | BODY MASS INDEX: 21.37 KG/M2 | SYSTOLIC BLOOD PRESSURE: 120 MMHG

## 2024-01-15 DIAGNOSIS — E16.2 HYPOGLYCEMIA: ICD-10-CM

## 2024-01-15 DIAGNOSIS — S09.90XS INJURY OF HEAD, SEQUELA: ICD-10-CM

## 2024-01-15 DIAGNOSIS — N92.1 MENORRHAGIA WITH IRREGULAR CYCLE: ICD-10-CM

## 2024-01-15 DIAGNOSIS — E28.8 HYPERANDROGENISM: ICD-10-CM

## 2024-01-15 DIAGNOSIS — I95.9 HYPOTENSION, UNSPECIFIED HYPOTENSION TYPE: ICD-10-CM

## 2024-01-15 DIAGNOSIS — E28.2 POLYCYSTIC OVARIAN SYNDROME: Primary | ICD-10-CM

## 2024-01-15 PROCEDURE — 99244 OFF/OP CNSLTJ NEW/EST MOD 40: CPT | Performed by: STUDENT IN AN ORGANIZED HEALTH CARE EDUCATION/TRAINING PROGRAM

## 2024-01-15 RX ORDER — BLOOD-GLUCOSE METER
EACH MISCELLANEOUS DAILY
Qty: 1 KIT | Refills: 1 | Status: SHIPPED | OUTPATIENT
Start: 2024-01-15

## 2024-01-15 RX ORDER — BLOOD SUGAR DIAGNOSTIC
STRIP MISCELLANEOUS
Qty: 25 EACH | Refills: 3 | Status: SHIPPED | OUTPATIENT
Start: 2024-01-15

## 2024-01-15 RX ORDER — LANCETS
EACH MISCELLANEOUS
Qty: 90 EACH | Refills: 1 | Status: SHIPPED | OUTPATIENT
Start: 2024-01-15

## 2024-01-15 NOTE — PROGRESS NOTES
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  thereof and agree with the findings.  Exam End: --    Specimen Collected: 03/20/15  4:17 PM Last Resulted: 03/31/15  1:53 PM               Portions of the record may have been created with voice recognition software. Occasional wrong word or \"sound a like\" substitutions may have occurred due to the inherent limitations of voice recognition software. Read the chart carefully and recognize, using context, where substitutions have occurred.    "

## 2024-01-24 ENCOUNTER — APPOINTMENT (OUTPATIENT)
Dept: LAB | Facility: CLINIC | Age: 24
End: 2024-01-24
Payer: COMMERCIAL

## 2024-01-24 DIAGNOSIS — G90.A POTS (POSTURAL ORTHOSTATIC TACHYCARDIA SYNDROME): ICD-10-CM

## 2024-01-24 DIAGNOSIS — E28.2 POLYCYSTIC OVARIAN SYNDROME: ICD-10-CM

## 2024-01-24 DIAGNOSIS — G90.9 AUTONOMIC DYSFUNCTION: ICD-10-CM

## 2024-01-24 DIAGNOSIS — S09.90XS INJURY OF HEAD, SEQUELA: ICD-10-CM

## 2024-01-24 DIAGNOSIS — N92.1 MENORRHAGIA WITH IRREGULAR CYCLE: ICD-10-CM

## 2024-01-24 DIAGNOSIS — I95.9 HYPOTENSION, UNSPECIFIED HYPOTENSION TYPE: ICD-10-CM

## 2024-01-24 DIAGNOSIS — E28.8 HYPERANDROGENISM: ICD-10-CM

## 2024-01-24 DIAGNOSIS — Z01.89 ROUTINE LAB DRAW: ICD-10-CM

## 2024-01-24 LAB
ALBUMIN SERPL BCP-MCNC: 4.4 G/DL (ref 3.5–5)
ALP SERPL-CCNC: 53 U/L (ref 34–104)
ALT SERPL W P-5'-P-CCNC: 64 U/L (ref 7–52)
ANION GAP SERPL CALCULATED.3IONS-SCNC: 7 MMOL/L
AST SERPL W P-5'-P-CCNC: 36 U/L (ref 13–39)
BILIRUB SERPL-MCNC: 0.69 MG/DL (ref 0.2–1)
BUN SERPL-MCNC: 13 MG/DL (ref 5–25)
CALCIUM SERPL-MCNC: 9.2 MG/DL (ref 8.4–10.2)
CHLORIDE SERPL-SCNC: 101 MMOL/L (ref 96–108)
CO2 SERPL-SCNC: 26 MMOL/L (ref 21–32)
CORTIS AM PEAK SERPL-MCNC: 15.7 UG/DL (ref 6.7–22.6)
CREAT SERPL-MCNC: 0.8 MG/DL (ref 0.6–1.3)
FERRITIN SERPL-MCNC: 47 NG/ML (ref 11–307)
GFR SERPL CREATININE-BSD FRML MDRD: 104 ML/MIN/1.73SQ M
GLUCOSE P FAST SERPL-MCNC: 83 MG/DL (ref 65–99)
IRON SATN MFR SERPL: 50 % (ref 15–50)
IRON SERPL-MCNC: 162 UG/DL (ref 50–212)
POTASSIUM SERPL-SCNC: 3.7 MMOL/L (ref 3.5–5.3)
PROLACTIN SERPL-MCNC: 23.74 NG/ML (ref 3.34–26.72)
PROT SERPL-MCNC: 6.7 G/DL (ref 6.4–8.4)
SODIUM SERPL-SCNC: 134 MMOL/L (ref 135–147)
T4 FREE SERPL-MCNC: 1.13 NG/DL (ref 0.61–1.12)
TIBC SERPL-MCNC: 324 UG/DL (ref 250–450)
TSH SERPL DL<=0.05 MIU/L-ACNC: 1.15 UIU/ML (ref 0.45–4.5)
UIBC SERPL-MCNC: 162 UG/DL (ref 155–355)

## 2024-01-24 PROCEDURE — 84403 ASSAY OF TOTAL TESTOSTERONE: CPT

## 2024-01-24 PROCEDURE — 83540 ASSAY OF IRON: CPT

## 2024-01-24 PROCEDURE — 84305 ASSAY OF SOMATOMEDIN: CPT

## 2024-01-24 PROCEDURE — 84402 ASSAY OF FREE TESTOSTERONE: CPT

## 2024-01-24 PROCEDURE — 83550 IRON BINDING TEST: CPT

## 2024-01-24 PROCEDURE — 84443 ASSAY THYROID STIM HORMONE: CPT

## 2024-01-24 PROCEDURE — 82627 DEHYDROEPIANDROSTERONE: CPT | Performed by: INTERNAL MEDICINE

## 2024-01-24 PROCEDURE — 83498 ASY HYDROXYPROGESTERONE 17-D: CPT

## 2024-01-24 PROCEDURE — 82533 TOTAL CORTISOL: CPT | Performed by: INTERNAL MEDICINE

## 2024-01-24 PROCEDURE — 82728 ASSAY OF FERRITIN: CPT

## 2024-01-24 PROCEDURE — 84146 ASSAY OF PROLACTIN: CPT

## 2024-01-24 PROCEDURE — 82024 ASSAY OF ACTH: CPT

## 2024-01-24 PROCEDURE — 36415 COLL VENOUS BLD VENIPUNCTURE: CPT

## 2024-01-24 PROCEDURE — 84439 ASSAY OF FREE THYROXINE: CPT

## 2024-01-24 PROCEDURE — 80053 COMPREHEN METABOLIC PANEL: CPT | Performed by: INTERNAL MEDICINE

## 2024-01-25 ENCOUNTER — TELEPHONE (OUTPATIENT)
Dept: LAB | Facility: HOSPITAL | Age: 24
End: 2024-01-25

## 2024-01-25 LAB
ACTH PLAS-MCNC: 43.3 PG/ML (ref 7.2–63.3)
DHEA-S SERPL-MCNC: 304 UG/DL (ref 110–431.7)
TESTOST FREE SERPL-MCNC: 2.5 PG/ML (ref 0–4.2)
TESTOST SERPL-MCNC: 41 NG/DL (ref 13–71)

## 2024-01-26 LAB — IGF-I SERPL-MCNC: 150 NG/ML (ref 101–347)

## 2024-01-27 LAB — 17OHP SERPL-MCNC: 48 NG/DL

## 2024-02-06 DIAGNOSIS — E16.2 HYPOGLYCEMIA: Primary | ICD-10-CM

## 2024-02-06 RX ORDER — LANCETS 33 GAUGE
EACH MISCELLANEOUS DAILY
Qty: 100 EACH | Refills: 1 | Status: SHIPPED | OUTPATIENT
Start: 2024-02-06

## 2024-02-08 ENCOUNTER — TELEPHONE (OUTPATIENT)
Dept: LAB | Facility: HOSPITAL | Age: 24
End: 2024-02-08

## 2024-02-13 ENCOUNTER — TELEPHONE (OUTPATIENT)
Dept: LAB | Facility: HOSPITAL | Age: 24
End: 2024-02-13

## 2024-02-21 ENCOUNTER — APPOINTMENT (OUTPATIENT)
Dept: LAB | Facility: HOSPITAL | Age: 24
End: 2024-02-21
Payer: COMMERCIAL

## 2024-02-21 DIAGNOSIS — Z01.89 LABORATORY PROCEDURE: ICD-10-CM

## 2024-02-23 ENCOUNTER — OFFICE VISIT (OUTPATIENT)
Dept: DENTISTRY | Facility: CLINIC | Age: 24
End: 2024-02-23

## 2024-02-23 VITALS — HEART RATE: 82 BPM | SYSTOLIC BLOOD PRESSURE: 116 MMHG | DIASTOLIC BLOOD PRESSURE: 67 MMHG

## 2024-02-23 DIAGNOSIS — K01.1 IMPACTED THIRD MOLAR TOOTH: Primary | ICD-10-CM

## 2024-02-23 DIAGNOSIS — K08.89 PAIN, DENTAL: ICD-10-CM

## 2024-02-23 PROCEDURE — D0140 LIMITED ORAL EVALUATION - PROBLEM FOCUSED: HCPCS | Performed by: DENTIST

## 2024-02-23 PROCEDURE — D0330 PANORAMIC RADIOGRAPHIC IMAGE: HCPCS | Performed by: DENTIST

## 2024-02-23 RX ORDER — AMOXICILLIN 500 MG/1
500 CAPSULE ORAL EVERY 8 HOURS SCHEDULED
Qty: 21 CAPSULE | Refills: 0 | Status: SHIPPED | OUTPATIENT
Start: 2024-02-23 | End: 2024-03-01

## 2024-02-23 NOTE — DENTAL PROCEDURE DETAILS
"Limited Dental Emergency Evaluation.  Patient (Colleen Alcaraz) presents for dental emergency visit.  Patient consent treatment.  RMH, no changes.  ASA: II  Pain score: 7  Chief complain: \"pain from my wisdom teeth it causes me a headache\".  HPI: patient reported constant pain of her wisdom teeth particularly upper right side started yesterday and causing a headache. Patient is currently taking OTC pain medication (Advil). Patient reported past history of ortho therapy but no recommendation for removing the wisdom teeth at that time.  Radiograph: PAN is taken.  EOE: WNL.  IOE/Assessment/Plan: Fully impacted wisdom teeth #1,16,17,32. No sign of swelling. Recommended extraction of wisdom teeth. Patient approved.    Referral: STAT to [facility] for evaluation and extraction of impacted teeth #1,16,17,32.   RX: Amoxicillin 500 mg, 21 caps, TID, for 7 days, no refill. Patient denies allergy. Advised for OTC pain medication Advil PRN.  POI is given. Advised to come back for COMP and FMX. Patient left satisfied and ambulatory.  NV: COMP and FMX.       "

## 2024-03-01 ENCOUNTER — TELEPHONE (OUTPATIENT)
Dept: DENTISTRY | Facility: CLINIC | Age: 24
End: 2024-03-01

## 2024-03-05 ENCOUNTER — OFFICE VISIT (OUTPATIENT)
Dept: ENDOCRINOLOGY | Facility: CLINIC | Age: 24
End: 2024-03-05
Payer: COMMERCIAL

## 2024-03-05 VITALS
HEIGHT: 63 IN | WEIGHT: 117.8 LBS | BODY MASS INDEX: 20.87 KG/M2 | DIASTOLIC BLOOD PRESSURE: 70 MMHG | HEART RATE: 74 BPM | SYSTOLIC BLOOD PRESSURE: 120 MMHG

## 2024-03-05 DIAGNOSIS — G90.A POTS (POSTURAL ORTHOSTATIC TACHYCARDIA SYNDROME): ICD-10-CM

## 2024-03-05 DIAGNOSIS — E28.2 PCOS (POLYCYSTIC OVARIAN SYNDROME): Primary | ICD-10-CM

## 2024-03-05 DIAGNOSIS — E16.1 REACTIVE HYPOGLYCEMIA: ICD-10-CM

## 2024-03-05 PROCEDURE — 99214 OFFICE O/P EST MOD 30 MIN: CPT | Performed by: INTERNAL MEDICINE

## 2024-03-05 NOTE — PROGRESS NOTES
Colleen Alcaraz 23 y.o. female MRN: [id number]    Encounter: [id number]      Assessment/Plan     Problem List Items Addressed This Visit          Digestive    Reactive hypoglycemia     Denies any recent hypoglycemic episodes            Endocrine    PCOS (polycystic ovarian syndrome) - Primary     Currently menstrual cycles are regular-no acne or hirsutism.,  Continue to monitor, focus on healthy lifestyle and weight            Cardiovascular and Mediastinum    POTS (postural orthostatic tachycardia syndrome)        CC: Polycystic ovarian syndrome      History of Present Illness     HPI:  23-year-old female with a complex past medical history-she recalls being diagnosed with PCOS as a teenager-has had history of irregular menstrual cycles, pelvic ultrasound showing multiple cysts on the ovaries however these results are not available-  She has also been diagnosed with POTS and reactive hypoglycemia as a teenager    Upcoming oral surgery     Poor appetite ,dental issues , weight fairly stable   Menstrual cycles sometimes 10 days late     No skin breakouts   \  Review of Systems    Historical Information   Past Medical History:   Diagnosis Date    Allergic 1/1/2008    Depression 06/1/2014    Migraine August 2014    I had severe migraines in high school, I rarely get migraines now but have started getting ocular migraines in the past year    Polycystic ovary syndrome 2016    ultrasound    Visual impairment 1/1/2015    Visual-spatial processing dysfunction and difficulty with convergence     Past Surgical History:   Procedure Laterality Date    TONSILECTOMY AND ADNOIDECTOMY       Social History   Social History     Substance and Sexual Activity   Alcohol Use Not Currently    Comment: social     Social History     Substance and Sexual Activity   Drug Use Never     Social History     Tobacco Use   Smoking Status Never   Smokeless Tobacco Never     Family History:   Family History   Problem Relation Age of Onset     Mental illness Father     Depression Father     Anxiety disorder Father     OCD Father     Seizures Brother         PANDAS/Auto-immune incephalitis    Autoimmune disease Brother         Autoimmune incephalitis and PANDAS    OCD Brother     Psychosis Brother     Vision loss Maternal Aunt     Thyroid disease unspecified Maternal Grandmother     Osteoporosis Paternal Grandmother     Diabetes type II Family        Meds/Allergies   Current Outpatient Medications   Medication Sig Dispense Refill    Blood Glucose Monitoring Suppl (OneTouch Verio) w/Device KIT Use in the morning 1 kit 1    chlorpheniramine (CHLOR-TRIMETON) 2 MG/5ML syrup Take 5 mL (2 mg total) by mouth every 4 (four) hours as needed for allergies 118 mL 0    glucose blood (OneTouch Verio) test strip Use as instructed 25 each 3    OneTouch Delica Lancets 33G MISC Use daily 100 each 1    fluticasone (Flovent HFA) 220 mcg/act inhaler INHALE 2 PUFFS BY MOUTH 2 TIMES A DAY. RINSE MOUTH AFTER USE. 36 g 1    ondansetron (ZOFRAN) 4 mg tablet Take 1 tablet (4 mg total) by mouth every 8 (eight) hours as needed for nausea or vomiting (Patient not taking: Reported on 12/20/2023) 30 tablet 3     No current facility-administered medications for this visit.     Allergies   Allergen Reactions    Albuterol Anxiety, Confusion, Cough, Dizziness, Hyperactivity, Lightheadedness, Palpitations, Shortness Of Breath, Tachycardia and Wheezing    Fruit Extracts Cough, Dizziness, Eye Swelling, Hives, Itching, Lip Swelling, Nasal Congestion, Palpitations, Rash, Shortness Of Breath, Sneezing, Swelling, Throat Swelling and Wheezing    Nuts - Food Allergy Cough, Hives, Itching, Palpitations, Shortness Of Breath and Swelling    Other Cough, Hives, Itching, Lip Swelling, Rash, Shortness Of Breath, Sneezing and Throat Swelling    Tree Extract Anaphylaxis     Tree nuts    Vegetable Extract Cough, Hives, Itching, Lip Swelling, Rash, Shortness Of Breath and Wheezing    Gadolinium Hives      "Developed small area of hives on her right flank and itchy eyes a few minutes after MRI contrast  Will need allergy prep for future MRI with contrast. DRDS RN 11/8/23    Doxycycline Dizziness, Drowsiness, Fatigue, Fever, Headache and Rash       Objective   Vitals: Blood pressure 120/70, pulse 74, height 5' 3\" (1.6 m), weight 53.4 kg (117 lb 12.8 oz).    Physical Exam  Vitals reviewed.   Constitutional:       General: She is not in acute distress.     Appearance: Normal appearance. She is obese. She is not ill-appearing, toxic-appearing or diaphoretic.   HENT:      Head: Normocephalic and atraumatic.   Eyes:      General: No scleral icterus.     Extraocular Movements: Extraocular movements intact.   Cardiovascular:      Rate and Rhythm: Normal rate and regular rhythm.      Heart sounds: Normal heart sounds. No murmur heard.  Pulmonary:      Effort: Pulmonary effort is normal. No respiratory distress.      Breath sounds: Normal breath sounds. No wheezing or rales.   Musculoskeletal:      Cervical back: Neck supple.      Right lower leg: No edema.      Left lower leg: No edema.   Lymphadenopathy:      Cervical: No cervical adenopathy.   Skin:     General: Skin is warm and dry.   Neurological:      General: No focal deficit present.      Mental Status: She is alert and oriented to person, place, and time.      Gait: Gait normal.   Psychiatric:         Mood and Affect: Mood normal.         Behavior: Behavior normal.         Thought Content: Thought content normal.         Judgment: Judgment normal.         The history was obtained from the review of the chart, patient.    Lab Results:   Lab Results   Component Value Date/Time    Potassium 3.7 01/24/2024 07:44 AM    Potassium 4.4 07/15/2023 09:34 AM    Chloride 101 01/24/2024 07:44 AM    Chloride 101 07/15/2023 09:34 AM    CO2 26 01/24/2024 07:44 AM    CO2 25 07/15/2023 09:34 AM    BUN 13 01/24/2024 07:44 AM    BUN 14 07/15/2023 09:34 AM    Creatinine 0.80 01/24/2024 " "07:44 AM    Creatinine 0.78 07/15/2023 09:34 AM    Glucose, Fasting 83 01/24/2024 07:44 AM    Calcium 9.2 01/24/2024 07:44 AM    Calcium 9.6 07/15/2023 09:34 AM    eGFR 104 01/24/2024 07:44 AM    eGFR 109 07/15/2023 09:34 AM    TSH 3RD GENERATON 1.146 01/24/2024 07:44 AM    TSH W/RFX TO FREE T4 1.04 07/15/2023 09:34 AM    Free T4 1.13 (H) 01/24/2024 07:44 AM    Prolactin 23.74 01/24/2024 07:44 AM    Testosterone, Free 2.5 01/24/2024 07:44 AM             Imaging Studies:             Portions of the record may have been created with voice recognition software. Occasional wrong word or \"sound a like\" substitutions may have occurred due to the inherent limitations of voice recognition software. Read the chart carefully and recognize, using context, where substitutions have occurred.    "

## 2024-03-07 NOTE — ASSESSMENT & PLAN NOTE
Currently menstrual cycles are regular-no acne or hirsutism.,  Continue to monitor, focus on healthy lifestyle and weight

## 2024-03-11 ENCOUNTER — TELEPHONE (OUTPATIENT)
Dept: CARDIOLOGY CLINIC | Facility: CLINIC | Age: 24
End: 2024-03-11

## 2024-03-11 ENCOUNTER — CONSULT (OUTPATIENT)
Dept: FAMILY MEDICINE CLINIC | Facility: CLINIC | Age: 24
End: 2024-03-11
Payer: COMMERCIAL

## 2024-03-11 VITALS
SYSTOLIC BLOOD PRESSURE: 116 MMHG | RESPIRATION RATE: 18 BRPM | DIASTOLIC BLOOD PRESSURE: 70 MMHG | HEIGHT: 63 IN | OXYGEN SATURATION: 98 % | TEMPERATURE: 98.5 F | WEIGHT: 117.8 LBS | BODY MASS INDEX: 20.87 KG/M2 | HEART RATE: 85 BPM

## 2024-03-11 DIAGNOSIS — Z01.818 PRE-OP EVALUATION: Primary | ICD-10-CM

## 2024-03-11 DIAGNOSIS — G90.A POTS (POSTURAL ORTHOSTATIC TACHYCARDIA SYNDROME): ICD-10-CM

## 2024-03-11 DIAGNOSIS — F32.1 MODERATE MAJOR DEPRESSION, SINGLE EPISODE (HCC): ICD-10-CM

## 2024-03-11 DIAGNOSIS — J45.909 REACTIVE AIRWAY DISEASE WITHOUT COMPLICATION, UNSPECIFIED ASTHMA SEVERITY, UNSPECIFIED WHETHER PERSISTENT: ICD-10-CM

## 2024-03-11 PROBLEM — Z00.00 ANNUAL PHYSICAL EXAM: Status: RESOLVED | Noted: 2023-04-26 | Resolved: 2024-03-11

## 2024-03-11 PROCEDURE — 99214 OFFICE O/P EST MOD 30 MIN: CPT | Performed by: STUDENT IN AN ORGANIZED HEALTH CARE EDUCATION/TRAINING PROGRAM

## 2024-03-11 NOTE — PROGRESS NOTES
Presurgical Evaluation    Subjective:      Patient ID: Colleen Alcaraz is a 24 y.o. female.    Chief Complaint   Patient presents with   • Pre-op Exam   • Physical Exam     Due 4/26/24       HPI    The following portions of the patient's history were reviewed and updated as appropriate: allergies, current medications, past family history, past medical history, past social history, past surgical history, and problem list.    Procedure date: March 19, 2024    Surgeon:  Dr. Alf Taylor  Planned procedure:  Extraction of teeth #1, 16 and coronectomy  Diagnosis for procedure:  [place] Teeth    Prior anesthesia: Yes   General; Complications:  Nausea    CAD History: None, occassional tachycardia   NOTE: Patient should see Cardiology if time available before surgery, and if appropriate.    Pulmonary History: None, RAD    Renal history: None    Diabetes History:  None     Neurological History:  migraines      On Immunosuppressant meds/biologics: No      Review of Systems   Constitutional:  Negative for chills and fever.   HENT:  Negative for sore throat.    Respiratory:  Negative for cough and shortness of breath.    Cardiovascular:  Negative for chest pain and palpitations.   Gastrointestinal:  Negative for abdominal pain, constipation, diarrhea, nausea and vomiting.   Genitourinary:  Negative for difficulty urinating and dysuria.   Neurological:  Negative for dizziness and headaches.         Current Outpatient Medications   Medication Sig Dispense Refill   • Blood Glucose Monitoring Suppl (OneTouch Verio) w/Device KIT Use in the morning (Patient taking differently: Use if needed) 1 kit 1   • chlorpheniramine (CHLOR-TRIMETON) 2 MG/5ML syrup Take 5 mL (2 mg total) by mouth every 4 (four) hours as needed for allergies 118 mL 0   • glucose blood (OneTouch Verio) test strip Use as instructed (Patient taking differently: if needed Use as instructed) 25 each 3   • OneTouch Delica Lancets 33G MISC Use daily (Patient  taking differently: Use if needed) 100 each 1     No current facility-administered medications for this visit.       Allergies on file:   Albuterol, Fruit extracts, Nuts - food allergy, Other, Tree extract, Vegetable extract, Gadolinium, and Doxycycline    Patient Active Problem List   Diagnosis   • PCOS (polycystic ovarian syndrome)   • POTS (postural orthostatic tachycardia syndrome)   • Reactive hypoglycemia   • Allergic rhinitis   • Reactive airway disease without complication   • PANDAS (pediatric autoimmune neuropsychiatric disease associated with streptococcal infection)    • Pain, dental   • Impacted third molar tooth        Past Medical History:   Diagnosis Date   • Allergic 1/1/2008   • Annual physical exam    • Depression 06/1/2014   • Migraine August 2014    I had severe migraines in high school, I rarely get migraines now but have started getting ocular migraines in the past year   • Polycystic ovary syndrome 2016    ultrasound   • Visual impairment 1/1/2015    Visual-spatial processing dysfunction and difficulty with convergence       Past Surgical History:   Procedure Laterality Date   • TONSILECTOMY AND ADNOIDECTOMY         Family History   Problem Relation Age of Onset   • Mental illness Father    • Depression Father    • Anxiety disorder Father    • OCD Father    • Seizures Brother         PANDAS/Auto-immune incephalitis   • Autoimmune disease Brother         Autoimmune incephalitis and PANDAS   • OCD Brother    • Psychosis Brother    • Vision loss Maternal Aunt    • Thyroid disease unspecified Maternal Grandmother    • Osteoporosis Paternal Grandmother    • Diabetes type II Family        Social History     Tobacco Use   • Smoking status: Never   • Smokeless tobacco: Never   Vaping Use   • Vaping status: Never Used   Substance Use Topics   • Alcohol use: Not Currently     Comment: social   • Drug use: Never       Objective:    Vitals:    03/11/24 0837   BP: 116/70   BP Location: Left arm   Patient  "Position: Sitting   Cuff Size: Standard   Pulse: 85   Resp: 18   Temp: 98.5 °F (36.9 °C)   TempSrc: Tympanic   SpO2: 98%   Weight: 53.4 kg (117 lb 12.8 oz)   Height: 5' 3\" (1.6 m)        Physical Exam  Constitutional:       Appearance: Normal appearance.   HENT:      Head: Normocephalic and atraumatic.      Right Ear: Tympanic membrane and ear canal normal.      Left Ear: Tympanic membrane and ear canal normal.      Nose: Nose normal. No congestion or rhinorrhea.      Mouth/Throat:      Mouth: Mucous membranes are moist.      Pharynx: Oropharynx is clear. No oropharyngeal exudate or posterior oropharyngeal erythema.   Eyes:      Conjunctiva/sclera: Conjunctivae normal.      Pupils: Pupils are equal, round, and reactive to light.   Cardiovascular:      Rate and Rhythm: Normal rate and regular rhythm.      Heart sounds: Normal heart sounds. No murmur heard.  Pulmonary:      Effort: Pulmonary effort is normal. No respiratory distress.      Breath sounds: Normal breath sounds. No wheezing.   Abdominal:      General: Abdomen is flat. Bowel sounds are normal. There is no distension.      Palpations: Abdomen is soft. There is no mass.      Tenderness: There is no abdominal tenderness.      Hernia: No hernia is present.   Musculoskeletal:      Cervical back: Neck supple. No tenderness.      Right lower leg: No edema.      Left lower leg: No edema.   Neurological:      Mental Status: She is alert and oriented to person, place, and time.   Psychiatric:         Mood and Affect: Mood normal.         Behavior: Behavior normal.           Preop labs/testing available and reviewed: yes, patient will fax over               EKG no    Echo no    Stress test/cath no    PFT/Dilip no    Functional capacity: Walking , 4-5 MPH               4 Mets   Pick the highest level patient can comfortably perform   4 mets or greater for surgery    RCRI  High Risk surgery?         1 Point  CAD History:         1 Point   MI; Positive Stress Test; CP " "due to Mi;  Nitrate Usage to control Angina; Pathologic Q wave on EKG  CHF Active:         1 Point   Pulm Edema; Paroxysmal Nocturnal Dyspnea;  Bibasilar Rales (crackles);S3; CHF on CXR  Cerebrovascular Disease (TIA or CVA):     1 Point  DM on Insulin:        1 Point  Serum Creat >2.0 mg/dl:       1 Point          Total Points: 0     Scorin: Class I, Very Low Risk (0.4%)     1: Class II, Low risk (0.9%)     2: Class III Moderate (6.6%)     3: Class IV High (>11%)      SHRUTHI Risk:  GFR:        For PCP:  If GFR>60, Hold ACE/ARB/Diuretic on the day of surgery, and NSAIDS 10 days before.    If GFR<45, Consider PRE and POST op Nephrology Consult.    If 46 <GFR> 59 : Has Patient had SHRUTHI in last 6 Months? no   If YES: Preop Nephrology consult   If No:  Post Op Nephrology consult.           Assessment/Plan:    Patient is medically optimized for the planned procedure.    Further testing/evaluation is required - recommend clearance from cardiology given POTS syndrome and Mets.    Postop concerns: no    Problem List Items Addressed This Visit     POTS (postural orthostatic tachycardia syndrome)    Reactive airway disease without complication   Other Visit Diagnoses     Pre-op evaluation    -  Primary    Moderate major depression, single episode (HCC)                     Pre-Surgery Instructions:   Medication Instructions   • Blood Glucose Monitoring Suppl (OneTouch Verio) w/Device KIT per anesthesia guidelines    • chlorpheniramine (CHLOR-TRIMETON) 2 MG/5ML syrup per anesthesia guidelines    • glucose blood (OneTouch Verio) test strip per anesthesia guidelines    • OneTouch Delica Lancets 33G MISC per anesthesia guidelines         NOTE: Please use the above to review important meds for your specialty, the remainder \"per anesthesia Guidelines.\"    NOTE: Please place an Inbasket message for \"SOC\" pool for complicated patients.    Depression Screening Follow-up Plan: Patient's depression screening was positive with a PHQ-2 " score of 3. Their PHQ-9 score was 12. Continue regular follow-up with their psychologist/therapist/psychiatrist who is managing their mental health condition(s).

## 2024-03-11 NOTE — TELEPHONE ENCOUNTER
Patient's mother called, patient did also send you a message through [employer].  [place] teeth extraction Next Tuesday 3/19 at [facility].  Will be under general anesthesia, have to remove some of her sinuses to get to the impacted teeth.      Patient last saw you in 2022 for POTS which she states her symptoms have been stable, no change.      She had an EKG done in the ED 12/2023 which showed NSR.      Patient's mother is sending me information on the surgeon so I can place a formal clearance if you are able to clear patient.  I do have an opening with Maddi tomorrow as well if you require patient to be seen.

## 2024-03-11 NOTE — PATIENT INSTRUCTIONS
Depression   AMBULATORY CARE:   Depression  is a mood disorder that causes feelings of sadness or hopelessness that do not go away. Depression may cause you to lose interest in things you used to enjoy. These feelings may interfere with your daily life.  Common signs and symptoms:   Appetite changes, or weight gain or loss    Trouble falling or staying asleep, or sleeping too much    Fatigue (being mentally and physically tired) or lack of energy    Feeling restless, irritable, or withdrawn    Feeling worthless, hopeless, discouraged, or guilty    Trouble concentrating, remembering things, doing daily tasks, or making decisions    Thoughts about hurting or killing yourself    Call your local emergency number (911 in the [place]) if:   You think about hurting yourself or someone else.    You have done something on purpose to hurt yourself.    Call your therapist or doctor if:   Your symptoms get worse or do not get better with treatment.    Your depression keeps you from doing your regular daily activities.    You have new symptoms since your last visit.    You have questions or concerns about your condition or care.    The following resources are available at any time to help you, if needed:   Contact a suicide prevention organization:        For the [employer] Suicide and Crisis Lifeline:     Call or text [employer]     Send a chat on https://[employer].org/chat     Call 9-763-580-6981 (1-800-273-TALK)    For the Suicide Hotline, call 9-110-628-7651 (0-903-MDYFMDA)    For a list of international numbers: https://save.org/find-help/international-resources/  Treatment for depression  depends on how severe your symptoms are. You may need any of the following:  Cognitive behavioral therapy (CBT)  teaches you how to identify and change negative thought patterns.    Antidepressant medicine  may be given to decrease or manage symptoms. You may need to take this medicine for several weeks before they start working.    Self-care:   Talk to  someone about your depression.  Your healthcare provider may suggest counseling. You might feel more comfortable talking with a friend or family member about your depression. Choose someone you know will be supportive and encouraging.    Get regular physical activity.  Physical activity can lower your stress, improve your mood, and help you sleep better. Work with your healthcare provider to develop a plan that you enjoy.         Create a regular sleep schedule.  A routine can help you relax before bed. Listen to music, read, or do yoga. Try to go to bed and wake up at the same time every day. Sleep is important for emotional health.    Eat a variety of healthy foods.  Healthy foods include fruits, vegetables, whole-grain breads, low-fat dairy products, lean meats, fish, and cooked beans. A healthy meal plan is low in fat, salt, and added sugar.         Do not use alcohol, drugs, or nicotine products.  These can worsen depression or make it hard to manage. Talk to your therapist or healthcare provider if you use any of these products and need help to quit.    Follow up with your therapist or doctor as directed:  Your healthcare provider will monitor your progress at follow-up visits. Your provider will also monitor your medicine if you take antidepressants and ask if the medicine is helping. Tell your provider about any side effects or problems you have with your medicine. The type or amount of medicine may need to be changed. Write down your questions so you remember to ask them during your visits.  For more information or support:   [employer]  [address]  [place] [address]  Phone: 6- 243 - 540-5186  Phone: 1- 265 - 445-4452  Web Address: http://www.[employer].org  [facility] Su[facility] and Crisis [facility]  [address]  [place] [address]  Phone: 2- 197 - 634  Web Address: http://www.suicidepreventionlifeline.org OR https://[employer].org/chat/    © Copyright [employer] 2023  Information is for End User's use only and may not be sold, redistributed or otherwise used for commercial purposes.  The above information is an  only. It is not intended as medical advice for individual conditions or treatments. Talk to your doctor, nurse or pharmacist before following any medical regimen to see if it is safe and effective for you.

## 2024-05-15 ENCOUNTER — OFFICE VISIT (OUTPATIENT)
Dept: FAMILY MEDICINE CLINIC | Facility: CLINIC | Age: 24
End: 2024-05-15
Payer: COMMERCIAL

## 2024-05-15 VITALS
HEART RATE: 85 BPM | OXYGEN SATURATION: 97 % | WEIGHT: 133 LBS | DIASTOLIC BLOOD PRESSURE: 70 MMHG | BODY MASS INDEX: 24.48 KG/M2 | TEMPERATURE: 99 F | HEIGHT: 62 IN | SYSTOLIC BLOOD PRESSURE: 133 MMHG

## 2024-05-15 DIAGNOSIS — D89.89 PANDAS (PEDIATRIC AUTOIMMUNE NEUROPSYCHIATRIC DISEASE ASSOCIATED WITH STREPTOCOCCAL INFECTION)  (HCC): ICD-10-CM

## 2024-05-15 DIAGNOSIS — F43.10 PTSD (POST-TRAUMATIC STRESS DISORDER): ICD-10-CM

## 2024-05-15 DIAGNOSIS — B94.8 PANDAS (PEDIATRIC AUTOIMMUNE NEUROPSYCHIATRIC DISEASE ASSOCIATED WITH STREPTOCOCCAL INFECTION)  (HCC): ICD-10-CM

## 2024-05-15 DIAGNOSIS — Z00.00 ANNUAL PHYSICAL EXAM: Primary | ICD-10-CM

## 2024-05-15 PROCEDURE — 99214 OFFICE O/P EST MOD 30 MIN: CPT | Performed by: FAMILY MEDICINE

## 2024-05-15 PROCEDURE — 99395 PREV VISIT EST AGE 18-39: CPT | Performed by: FAMILY MEDICINE

## 2024-05-15 RX ORDER — ESCITALOPRAM OXALATE 5 MG/1
5 TABLET ORAL DAILY
Qty: 30 TABLET | Refills: 1 | Status: SHIPPED | OUTPATIENT
Start: 2024-05-15

## 2024-05-20 NOTE — PROGRESS NOTES
Assessment/Plan:    23 y/o woman with: PANDAS and PTSD along with annual well visit. Discussed workup and treatment options. Discussed supportive care and return parameters. Pt to consider therapy and will begin trial of Lexapro. Follow-up in 1 mo.    Regarding Annual well visit. Discussed various safety and health maintenance issues including healthy diet like the Mediterranean diet, exercise, ample sleep, stress reduction, and healthy weight as tolerated. Discussed supportive care and return parameters.     No problem-specific Assessment & Plan notes found for this encounter.       Diagnoses and all orders for this visit:    Annual physical exam    PANDAS (pediatric autoimmune neuropsychiatric disease associated with streptococcal infection)  (Formerly [facility])    PTSD (post-traumatic stress disorder)  -     escitalopram (LEXAPRO) 5 mg tablet; Take 1 tablet (5 mg total) by mouth daily    Other orders  -     CHLORPHENIRAMINE MALEATE CR PO; Take by mouth          Subjective:     Chief Complaint   Patient presents with    Well Check     Annual physical    stomach issues     Patient states she was having severe diarrhea about 10 minutes after eating. This started about a month ago. Patient states it has gotten better but the symptoms are still present. No further concerns, ng        Patient ID: Colleen Alcaraz is a 24 y.o. female.    Patient is a 23 y/o woman who presents for follow-up on PANDAS and PTSD. Pt admits being generally stable except for some breakthrough depression no fevers chills nausea or vomiting. Pt also here for annual well visit admits generally she is active eats and sleeps well.        The following portions of the patient's history were reviewed and updated as appropriate: allergies, current medications, past family history, past medical history, past social history, past surgical history and problem list.    Review of Systems   Constitutional: Negative.    HENT: Negative.     Eyes: Negative.   "  Respiratory: Negative.     Cardiovascular: Negative.    Gastrointestinal: Negative.    Endocrine: Negative.    Genitourinary: Negative.    Musculoskeletal: Negative.    Allergic/Immunologic: Negative.    Neurological: Negative.    Hematological: Negative.    Psychiatric/Behavioral: Negative.     All other systems reviewed and are negative.        Objective:      /70 (BP Location: Right arm, Patient Position: Sitting, Cuff Size: Standard)   Pulse 85   Temp 99 °F (37.2 °C) (Temporal)   Ht 5' 2.25\" (1.581 m)   Wt 60.3 kg (133 lb)   SpO2 97%   BMI 24.13 kg/m²          Physical Exam  Constitutional:       Appearance: She is well-developed.   HENT:      Head: Atraumatic.      Right Ear: External ear normal.      Left Ear: External ear normal.   Eyes:      Extraocular Movements: EOM normal.      Conjunctiva/sclera: Conjunctivae normal.      Pupils: Pupils are equal, round, and reactive to light.   Cardiovascular:      Rate and Rhythm: Normal rate and regular rhythm.      Heart sounds: Normal heart sounds.   Pulmonary:      Effort: Pulmonary effort is normal. No respiratory distress.      Breath sounds: Normal breath sounds.   Abdominal:      General: There is no distension.      Palpations: Abdomen is soft.      Tenderness: There is no abdominal tenderness. There is no guarding or rebound.   Musculoskeletal:         General: Normal range of motion.      Cervical back: Normal range of motion.   Skin:     General: Skin is warm and dry.   Neurological:      Mental Status: She is alert and oriented to person, place, and time.      Cranial Nerves: No cranial nerve deficit.   Psychiatric:         Mood and Affect: Mood and affect normal.         Behavior: Behavior normal.         Thought Content: Thought content normal.         Judgment: Judgment normal.         "

## 2024-06-07 ENCOUNTER — RA CDI HCC (OUTPATIENT)
Dept: OTHER | Facility: HOSPITAL | Age: 24
End: 2024-06-07

## 2024-06-07 DIAGNOSIS — F43.10 PTSD (POST-TRAUMATIC STRESS DISORDER): ICD-10-CM

## 2024-06-07 PROBLEM — Z00.00 ANNUAL PHYSICAL EXAM: Status: RESOLVED | Noted: 2023-04-26 | Resolved: 2024-06-07

## 2024-06-08 RX ORDER — ESCITALOPRAM OXALATE 5 MG/1
5 TABLET ORAL DAILY
Qty: 90 TABLET | Refills: 1 | Status: SHIPPED | OUTPATIENT
Start: 2024-06-08

## 2024-06-14 ENCOUNTER — OFFICE VISIT (OUTPATIENT)
Dept: FAMILY MEDICINE CLINIC | Facility: CLINIC | Age: 24
End: 2024-06-14
Payer: COMMERCIAL

## 2024-06-14 VITALS
HEART RATE: 91 BPM | DIASTOLIC BLOOD PRESSURE: 77 MMHG | OXYGEN SATURATION: 97 % | TEMPERATURE: 97.6 F | SYSTOLIC BLOOD PRESSURE: 115 MMHG | HEIGHT: 63 IN | WEIGHT: 113.4 LBS | BODY MASS INDEX: 20.09 KG/M2

## 2024-06-14 DIAGNOSIS — F43.10 PTSD (POST-TRAUMATIC STRESS DISORDER): ICD-10-CM

## 2024-06-14 PROCEDURE — 99213 OFFICE O/P EST LOW 20 MIN: CPT | Performed by: FAMILY MEDICINE

## 2024-06-14 RX ORDER — ESCITALOPRAM OXALATE 10 MG/1
10 TABLET ORAL DAILY
Qty: 30 TABLET | Refills: 1 | Status: SHIPPED | OUTPATIENT
Start: 2024-06-14

## 2024-06-18 PROBLEM — F43.10 PTSD (POST-TRAUMATIC STRESS DISORDER): Status: ACTIVE | Noted: 2024-06-18

## 2024-06-18 NOTE — PROGRESS NOTES
"Assessment/Plan:    23 y/o woman with: PTSD. Discussed treatment options. Pt opts to increase to 10mg. Will call with update in 4-6 weeks. Discussed supportive care and return parameters.     No problem-specific Assessment & Plan notes found for this encounter.       Diagnoses and all orders for this visit:    PTSD (post-traumatic stress disorder)  -     escitalopram (LEXAPRO) 10 mg tablet; Take 1 tablet (10 mg total) by mouth daily          Subjective:     Chief Complaint   Patient presents with    Follow-up     1 month follow up appointment to the start of escitalopram. Patient states she has felt fine on the medication and just had some back acne. No further concerns        Patient ID: Colleen Alcaraz is a 24 y.o. female.    Patient is a 23 y/o woman who presents for follow-up on PTSD. Pt admits slight improvement on lexapro, no major side effects no fevers chills nausea or vomiting.        The following portions of the patient's history were reviewed and updated as appropriate: allergies, current medications, past family history, past medical history, past social history, past surgical history and problem list.    Review of Systems   Constitutional: Negative.    HENT: Negative.     Eyes: Negative.    Respiratory: Negative.     Cardiovascular: Negative.    Gastrointestinal: Negative.    Endocrine: Negative.    Genitourinary: Negative.    Musculoskeletal: Negative.    Allergic/Immunologic: Negative.    Neurological: Negative.    Hematological: Negative.    Psychiatric/Behavioral:  Positive for dysphoric mood. The patient is nervous/anxious.    All other systems reviewed and are negative.        Objective:      /77 (BP Location: Left arm, Patient Position: Sitting, Cuff Size: Standard)   Pulse 91   Temp 97.6 °F (36.4 °C) (Temporal)   Ht 5' 3\" (1.6 m)   Wt 51.4 kg (113 lb 6.4 oz)   SpO2 97%   BMI 20.09 kg/m²          Physical Exam  Constitutional:       Appearance: She is well-developed.   HENT:    "   Head: Atraumatic.      Right Ear: External ear normal.      Left Ear: External ear normal.   Eyes:      Extraocular Movements: EOM normal.      Conjunctiva/sclera: Conjunctivae normal.      Pupils: Pupils are equal, round, and reactive to light.   Pulmonary:      Effort: Pulmonary effort is normal. No respiratory distress.   Abdominal:      General: There is no distension.   Musculoskeletal:         General: Normal range of motion.      Cervical back: Normal range of motion.   Skin:     General: Skin is warm and dry.   Neurological:      Mental Status: She is alert and oriented to person, place, and time.      Cranial Nerves: No cranial nerve deficit.   Psychiatric:         Mood and Affect: Mood and affect normal.         Behavior: Behavior normal.         Thought Content: Thought content normal.         Judgment: Judgment normal.

## 2024-07-02 ENCOUNTER — PATIENT MESSAGE (OUTPATIENT)
Dept: FAMILY MEDICINE CLINIC | Facility: CLINIC | Age: 24
End: 2024-07-02

## 2024-07-07 DIAGNOSIS — F43.10 PTSD (POST-TRAUMATIC STRESS DISORDER): ICD-10-CM

## 2024-07-07 RX ORDER — ESCITALOPRAM OXALATE 10 MG/1
10 TABLET ORAL DAILY
Qty: 100 TABLET | Refills: 1 | Status: SHIPPED | OUTPATIENT
Start: 2024-07-07

## 2024-07-10 ENCOUNTER — OFFICE VISIT (OUTPATIENT)
Dept: FAMILY MEDICINE CLINIC | Facility: CLINIC | Age: 24
End: 2024-07-10
Payer: COMMERCIAL

## 2024-07-10 VITALS
WEIGHT: 117.6 LBS | DIASTOLIC BLOOD PRESSURE: 59 MMHG | HEIGHT: 62 IN | BODY MASS INDEX: 21.64 KG/M2 | HEART RATE: 83 BPM | TEMPERATURE: 99.7 F | OXYGEN SATURATION: 97 % | SYSTOLIC BLOOD PRESSURE: 110 MMHG

## 2024-07-10 DIAGNOSIS — F43.10 PTSD (POST-TRAUMATIC STRESS DISORDER): Primary | ICD-10-CM

## 2024-07-10 DIAGNOSIS — E28.2 PCOS (POLYCYSTIC OVARIAN SYNDROME): ICD-10-CM

## 2024-07-10 DIAGNOSIS — G90.A POTS (POSTURAL ORTHOSTATIC TACHYCARDIA SYNDROME): ICD-10-CM

## 2024-07-10 PROCEDURE — 99213 OFFICE O/P EST LOW 20 MIN: CPT | Performed by: FAMILY MEDICINE

## 2024-07-15 NOTE — PROGRESS NOTES
Assessment/Plan:    25 y/o woman with: PCOS, PTSD and PANDAS. Discussed workup and treatment options. Discussed supportive care and return parameters. Pt opts to try moving dose and giving it another 1-2 weeks on med. But will call back to update us. If not improving recommend switching to something.    No problem-specific Assessment & Plan notes found for this encounter.       Diagnoses and all orders for this visit:    PTSD (post-traumatic stress disorder)    PCOS (polycystic ovarian syndrome)    POTS (postural orthostatic tachycardia syndrome)          Subjective:     Chief Complaint   Patient presents with    Follow-up     Follow up and med check. Patient would like to discuss Lexapro increase.    Knee Pain     Patient fell and hurt left knee last week. Patient states she now has some pain and would like to have it looked at. No further concerns, ng        Patient ID: Colleen Alcaraz is a 24 y.o. female.    Patient is a 25 y/o woman who presents for follow-up on PCOS, PTSD and PANDAS. Pt admits some significant side effects from lexapro no fevers chills no SI or HI.    Knee Pain         The following portions of the patient's history were reviewed and updated as appropriate: allergies, current medications, past family history, past medical history, past social history, past surgical history and problem list.    Review of Systems   Constitutional: Negative.    HENT: Negative.     Eyes: Negative.    Respiratory: Negative.     Cardiovascular: Negative.    Gastrointestinal: Negative.    Endocrine: Negative.    Genitourinary: Negative.    Musculoskeletal: Negative.    Allergic/Immunologic: Negative.    Neurological: Negative.    Hematological: Negative.    Psychiatric/Behavioral:  Positive for dysphoric mood.    All other systems reviewed and are negative.        Objective:      /59 (BP Location: Left arm, Patient Position: Sitting, Cuff Size: Standard)   Pulse 83   Temp 99.7 °F (37.6 °C) (Temporal)    "Ht 5' 2.25\" (1.581 m)   Wt 53.3 kg (117 lb 9.6 oz)   SpO2 97%   BMI 21.34 kg/m²          Physical Exam  Constitutional:       Appearance: She is well-developed.   HENT:      Head: Atraumatic.      Right Ear: External ear normal.      Left Ear: External ear normal.   Eyes:      Extraocular Movements: EOM normal.      Conjunctiva/sclera: Conjunctivae normal.      Pupils: Pupils are equal, round, and reactive to light.   Cardiovascular:      Rate and Rhythm: Normal rate and regular rhythm.      Heart sounds: Normal heart sounds.   Pulmonary:      Effort: Pulmonary effort is normal. No respiratory distress.      Breath sounds: Normal breath sounds.   Abdominal:      General: There is no distension.      Palpations: Abdomen is soft.   Musculoskeletal:         General: Normal range of motion.      Cervical back: Normal range of motion.   Skin:     General: Skin is warm and dry.   Neurological:      Mental Status: She is alert and oriented to person, place, and time.      Cranial Nerves: No cranial nerve deficit.   Psychiatric:         Mood and Affect: Mood and affect normal.         Behavior: Behavior normal.         Thought Content: Thought content normal.         Judgment: Judgment normal.         "

## 2024-07-26 ENCOUNTER — TELEPHONE (OUTPATIENT)
Dept: FAMILY MEDICINE CLINIC | Facility: CLINIC | Age: 24
End: 2024-07-26

## 2024-07-26 NOTE — TELEPHONE ENCOUNTER
Noted. Recommend monitoring her symptoms and please have her call back if symptoms are not improving or worsening or if she needs any additional help

## 2024-09-18 ENCOUNTER — OFFICE VISIT (OUTPATIENT)
Dept: FAMILY MEDICINE CLINIC | Facility: CLINIC | Age: 24
End: 2024-09-18
Payer: COMMERCIAL

## 2024-09-18 VITALS
HEIGHT: 63 IN | WEIGHT: 120.4 LBS | SYSTOLIC BLOOD PRESSURE: 110 MMHG | RESPIRATION RATE: 16 BRPM | OXYGEN SATURATION: 100 % | TEMPERATURE: 98.4 F | HEART RATE: 85 BPM | BODY MASS INDEX: 21.33 KG/M2 | DIASTOLIC BLOOD PRESSURE: 82 MMHG

## 2024-09-18 DIAGNOSIS — J02.9 ACUTE PHARYNGITIS, UNSPECIFIED ETIOLOGY: Primary | ICD-10-CM

## 2024-09-18 PROCEDURE — 87070 CULTURE OTHR SPECIMN AEROBIC: CPT | Performed by: FAMILY MEDICINE

## 2024-09-18 PROCEDURE — 99213 OFFICE O/P EST LOW 20 MIN: CPT | Performed by: FAMILY MEDICINE

## 2024-09-18 RX ORDER — AMOXICILLIN 500 MG/1
500 TABLET, FILM COATED ORAL 3 TIMES DAILY
Qty: 21 TABLET | Refills: 0 | Status: SHIPPED | OUTPATIENT
Start: 2024-09-18 | End: 2024-09-25

## 2024-09-19 NOTE — PROGRESS NOTES
Assessment/Plan:    23 y/o woman with: acute pharyngitis. Will add antibiotic and do throat cx. Discussed supportive care and return parameters.     No problem-specific Assessment & Plan notes found for this encounter.       Diagnoses and all orders for this visit:    Acute pharyngitis, unspecified etiology  -     amoxicillin (AMOXIL) 500 MG tablet; Take 1 tablet (500 mg total) by mouth 3 (three) times a day for 7 days  -     Throat culture; Future  -     amoxicillin-clavulanate (AUGMENTIN) 875-125 mg per tablet; Take 1 tablet by mouth every 12 (twelve) hours for 7 days  -     Throat culture          Subjective:     Chief Complaint   Patient presents with    Cold Like Symptoms     Pt states she has had sore throat ,chills headaches for about 1 week. Pt states she developed a rash around her waist line and on left thigh. Pt states she stopped taking her antidepressants. Pt also expericing dizzy spells,No other concerns. [facility]        Patient ID: Colleen Alcaraz is a 24 y.o. female.    Patient is a 23 y/o woman who presents for follow-up sore throat and congestion for a few days no fevers chills nausea or vomiting.        The following portions of the patient's history were reviewed and updated as appropriate: allergies, current medications, past family history, past medical history, past social history, past surgical history and problem list.    Review of Systems   Constitutional: Negative.    HENT:  Positive for congestion and sore throat.    Eyes: Negative.    Respiratory: Negative.     Cardiovascular: Negative.    Gastrointestinal: Negative.    Endocrine: Negative.    Genitourinary: Negative.    Musculoskeletal: Negative.    Allergic/Immunologic: Negative.    Neurological: Negative.    Hematological: Negative.    Psychiatric/Behavioral: Negative.     All other systems reviewed and are negative.        Objective:      /82 (BP Location: Right arm, Patient Position: Sitting, Cuff Size: Standard)   Pulse 85   " Temp 98.4 °F (36.9 °C) (Temporal)   Resp 16   Ht 5' 3\" (1.6 m)   Wt 54.6 kg (120 lb 6.4 oz)   SpO2 100%   BMI 21.33 kg/m²          Physical Exam  Constitutional:       Appearance: She is well-developed.   HENT:      Head: Atraumatic.      Right Ear: External ear normal.      Left Ear: External ear normal.   Eyes:      Extraocular Movements: EOM normal.      Conjunctiva/sclera: Conjunctivae normal.      Pupils: Pupils are equal, round, and reactive to light.   Cardiovascular:      Rate and Rhythm: Normal rate and regular rhythm.      Heart sounds: Normal heart sounds.   Pulmonary:      Effort: Pulmonary effort is normal. No respiratory distress.      Breath sounds: Normal breath sounds.   Abdominal:      General: There is no distension.      Palpations: Abdomen is soft.      Tenderness: There is no abdominal tenderness. There is no guarding or rebound.   Musculoskeletal:         General: Normal range of motion.      Cervical back: Normal range of motion.   Skin:     General: Skin is warm and dry.   Neurological:      Mental Status: She is alert and oriented to person, place, and time.      Cranial Nerves: No cranial nerve deficit.   Psychiatric:         Mood and Affect: Mood and affect normal.         Behavior: Behavior normal.         Thought Content: Thought content normal.         Judgment: Judgment normal.         "

## 2024-09-20 LAB — BACTERIA THROAT CULT: NORMAL

## 2024-10-02 ENCOUNTER — OFFICE VISIT (OUTPATIENT)
Age: 24
End: 2024-10-02
Payer: COMMERCIAL

## 2024-10-02 VITALS
WEIGHT: 120 LBS | HEIGHT: 63 IN | HEART RATE: 72 BPM | DIASTOLIC BLOOD PRESSURE: 78 MMHG | OXYGEN SATURATION: 99 % | SYSTOLIC BLOOD PRESSURE: 108 MMHG | TEMPERATURE: 98.1 F | BODY MASS INDEX: 21.26 KG/M2

## 2024-10-02 DIAGNOSIS — F43.10 PTSD (POST-TRAUMATIC STRESS DISORDER): ICD-10-CM

## 2024-10-02 DIAGNOSIS — J45.909 REACTIVE AIRWAY DISEASE WITHOUT COMPLICATION, UNSPECIFIED ASTHMA SEVERITY, UNSPECIFIED WHETHER PERSISTENT: Primary | ICD-10-CM

## 2024-10-02 PROCEDURE — 99214 OFFICE O/P EST MOD 30 MIN: CPT | Performed by: FAMILY MEDICINE

## 2024-10-02 RX ORDER — FLUTICASONE PROPIONATE 220 UG/1
2 AEROSOL, METERED RESPIRATORY (INHALATION) 2 TIMES DAILY
Qty: 36 G | Refills: 1 | Status: SHIPPED | OUTPATIENT
Start: 2024-10-02

## 2024-10-02 RX ORDER — BUPROPION HYDROCHLORIDE 150 MG/1
150 TABLET ORAL EVERY MORNING
Qty: 30 TABLET | Refills: 1 | Status: SHIPPED | OUTPATIENT
Start: 2024-10-02 | End: 2025-03-31

## 2024-10-03 NOTE — PROGRESS NOTES
Assessment/Plan:    24-year-old woman with: Reactive airway disease and PTSD will restart fluticasone inhaler will begin trial of Wellbutrin discussed supportive care and return parameters follow-up in 4 to 6 weeks    No problem-specific Assessment & Plan notes found for this encounter.       Diagnoses and all orders for this visit:    Reactive airway disease without complication, unspecified asthma severity, unspecified whether persistent  -     fluticasone (Flovent HFA) 220 mcg/act inhaler; Inhale 2 puffs 2 (two) times a day Rinse mouth after use.    PTSD (post-traumatic stress disorder)  -     buPROPion (WELLBUTRIN XL) 150 mg 24 hr tablet; Take 1 tablet (150 mg total) by mouth every morning          Subjective:     Chief Complaint   Patient presents with    Cough     Patient is complaining of cough, fatigue and joint pain. No further concerns, ng        Patient ID: Colleen Alcaraz is a 24 y.o. female.    Patient is a 24-year-old woman who presents for follow-up on reactive airway disease along with PTSD she admits she is still having cough congestion and wheezing and is also having some depression symptoms no fevers chills nausea vomiting no suicidal homicidal ideation at this time no other complaints at this time.     Cough  Associated symptoms include wheezing.       The following portions of the patient's history were reviewed and updated as appropriate: allergies, current medications, past family history, past medical history, past social history, past surgical history and problem list.    Review of Systems   Constitutional: Negative.    HENT:  Positive for congestion.    Eyes: Negative.    Respiratory:  Positive for cough and wheezing.    Cardiovascular: Negative.    Gastrointestinal: Negative.    Endocrine: Negative.    Genitourinary: Negative.    Musculoskeletal: Negative.    Allergic/Immunologic: Negative.    Neurological: Negative.    Hematological: Negative.    Psychiatric/Behavioral:  Positive  "for dysphoric mood.    All other systems reviewed and are negative.        Objective:      /78 (BP Location: Left arm, Patient Position: Sitting, Cuff Size: Standard)   Pulse 72   Temp 98.1 °F (36.7 °C) (Temporal)   Ht 5' 3\" (1.6 m)   Wt 54.4 kg (120 lb)   SpO2 99%   BMI 21.26 kg/m²          Physical Exam  Constitutional:       Appearance: She is well-developed.   HENT:      Head: Atraumatic.      Right Ear: External ear normal.      Left Ear: External ear normal.   Eyes:      Extraocular Movements: EOM normal.      Conjunctiva/sclera: Conjunctivae normal.      Pupils: Pupils are equal, round, and reactive to light.   Cardiovascular:      Rate and Rhythm: Normal rate and regular rhythm.      Heart sounds: Normal heart sounds.   Pulmonary:      Effort: Pulmonary effort is normal. No respiratory distress.      Breath sounds: Wheezing present.   Abdominal:      General: There is no distension.      Palpations: Abdomen is soft.      Tenderness: There is no abdominal tenderness. There is no guarding or rebound.   Musculoskeletal:         General: Normal range of motion.      Cervical back: Normal range of motion.   Skin:     General: Skin is warm and dry.   Neurological:      Mental Status: She is alert and oriented to person, place, and time.      Cranial Nerves: No cranial nerve deficit.   Psychiatric:         Mood and Affect: Mood and affect normal.         Behavior: Behavior normal.         Thought Content: Thought content normal.         Judgment: Judgment normal.         "

## 2024-10-09 ENCOUNTER — TELEPHONE (OUTPATIENT)
Age: 24
End: 2024-10-09

## 2024-10-09 DIAGNOSIS — J45.909 REACTIVE AIRWAY DISEASE WITHOUT COMPLICATION, UNSPECIFIED ASTHMA SEVERITY, UNSPECIFIED WHETHER PERSISTENT: Primary | ICD-10-CM

## 2024-10-09 NOTE — TELEPHONE ENCOUNTER
PA for fluticasone (Flovent HFA) 220 mcg/act inhaler SUBMITTED     via    []CMM-KEY:   [x]Surescripts-Case ID # [id number]  []Availity-Auth ID # NDC #   []Faxed to plan   []Other website   []Phone call Case ID #     Office notes sent, clinical questions answered. Awaiting determination    Turnaround time for your insurance to make a decision on your Prior Authorization can take 7-21 business days.

## 2024-10-10 NOTE — TELEPHONE ENCOUNTER
Please call pt to discuss that insurance refuses to pay for her med. Recommend she check with insurance or pharmacist to get a list of covered alternatives.

## 2024-10-10 NOTE — TELEPHONE ENCOUNTER
PA for fluticasone (Flovent HFA) 220 mcg/act inhaler  DENIED    Reason:(Screenshot if applicable)        Message sent to office clinical pool Yes    Denial letter scanned into Media NO    Did not receive upon determination     Appeal started No (Provider will need to decide if appeal is warranted and send clinical documentation to Prior Authorization Team for initiation.)    **Please follow up with your patient regarding denial and next steps**

## 2024-10-11 ENCOUNTER — PATIENT MESSAGE (OUTPATIENT)
Age: 24
End: 2024-10-11

## 2024-10-11 ENCOUNTER — TELEPHONE (OUTPATIENT)
Age: 24
End: 2024-10-11

## 2024-10-11 RX ORDER — BECLOMETHASONE DIPROPIONATE HFA 80 UG/1
2 AEROSOL, METERED RESPIRATORY (INHALATION) 2 TIMES DAILY
Qty: 31.8 G | Refills: 1 | Status: SHIPPED | OUTPATIENT
Start: 2024-10-11

## 2024-10-11 NOTE — PATIENT COMMUNICATION
Pt returned call to check on her refill request did check the same on chart and confirmed. Thanks

## 2024-10-14 NOTE — TELEPHONE ENCOUNTER
See telephone encounter from 10/9 and patient message from 10/11 for further info.     Med list updated.

## 2024-10-18 PROBLEM — J02.9 ACUTE PHARYNGITIS: Status: RESOLVED | Noted: 2024-09-18 | Resolved: 2024-10-18

## 2024-10-26 DIAGNOSIS — F43.10 PTSD (POST-TRAUMATIC STRESS DISORDER): ICD-10-CM

## 2024-10-27 RX ORDER — BUPROPION HYDROCHLORIDE 150 MG/1
150 TABLET ORAL EVERY MORNING
Qty: 90 TABLET | Refills: 1 | Status: SHIPPED | OUTPATIENT
Start: 2024-10-27

## 2024-10-30 ENCOUNTER — ANNUAL EXAM (OUTPATIENT)
Dept: OBGYN CLINIC | Facility: MEDICAL CENTER | Age: 24
End: 2024-10-30
Payer: COMMERCIAL

## 2024-10-30 VITALS
SYSTOLIC BLOOD PRESSURE: 100 MMHG | HEIGHT: 63 IN | DIASTOLIC BLOOD PRESSURE: 72 MMHG | BODY MASS INDEX: 20.5 KG/M2 | WEIGHT: 115.7 LBS

## 2024-10-30 DIAGNOSIS — E28.2 PCOS (POLYCYSTIC OVARIAN SYNDROME): ICD-10-CM

## 2024-10-30 DIAGNOSIS — Z01.419 ENCOUNTER FOR WELL WOMAN EXAM WITH ROUTINE GYNECOLOGICAL EXAM: Primary | ICD-10-CM

## 2024-10-30 PROCEDURE — S0612 ANNUAL GYNECOLOGICAL EXAMINA: HCPCS | Performed by: OBSTETRICS & GYNECOLOGY

## 2024-10-30 NOTE — PROGRESS NOTES
"[facility] [address], [place], [place]    ASSESSMENT/PLAN: Colleen Alcaraz is a 24 y.o. No obstetric history on file. who presents for annual gynecologic exam.    Encounter for routine gynecologic examination  - Routine well woman exam completed today.  - Cervical Cancer Screening: Current ASCCP Guidelines reviewed. Last Pap: 09/12/2023 . Next Pap Due: 2026  - HPV Vaccination status: Immunization series complete  - STI screening offered including HIV testing: not done  - Contraceptive counseling discussed.  Current contraception: none     Additional problems addressed during this visit:  1. Encounter for well woman exam with routine gynecological exam  2. PCOS (polycystic ovarian syndrome)    CC:  Annual Gynecologic Examination    HPI: Colleen Alcaraz is a 24 y.o. No obstetric history on file. who presents for annual gynecologic examination.  Gynecologic Exam      Patient presents for routine annual exam, working with her PCP for anxiety and depression which are associated with her cycle as well, discussed PMS and PMDD.   also reports increased pain with cycles, discussed possibility of endometriosis  Considering birth control      The following portions of the patient's history were reviewed and updated as appropriate: allergies, current medications, past family history, past medical history, obstetric history, gynecologic history, past social history, past surgical history and problem list.    Review of Systems   Constitutional: Negative.    HENT: Negative.     Eyes: Negative.    Respiratory: Negative.     Cardiovascular: Negative.    Gastrointestinal: Negative.    Genitourinary: Negative.    Musculoskeletal: Negative.    All other systems reviewed and are negative.        Objective:  /72   Ht 5' 2.5\" (1.588 m)   Wt 52.5 kg (115 lb 11.2 oz)   LMP 10/21/2024 (Exact Date)   BMI 20.82 kg/m²    Physical Exam  Vitals reviewed.   Constitutional:       General: " She is not in acute distress.     Appearance: She is well-developed.   HENT:      Head: Normocephalic and atraumatic.      Nose: Nose normal.   Cardiovascular:      Rate and Rhythm: Normal rate.   Pulmonary:      Effort: Pulmonary effort is normal. No respiratory distress.   Chest:   Breasts:     Breasts are symmetrical.      Right: Normal. No mass, nipple discharge, skin change or tenderness.      Left: Normal. No mass, nipple discharge, skin change or tenderness.   Abdominal:      General: There is no distension.      Palpations: Abdomen is soft. There is no mass.      Tenderness: There is no abdominal tenderness. There is no guarding or rebound.   Genitourinary:     General: Normal vulva.      Exam position: Lithotomy position.      Labia:         Right: No lesion.         Left: No lesion.       Urethra: No prolapse (urethral meatus normal).      Vagina: Normal. No vaginal discharge, erythema or bleeding.      Cervix: Normal.      Uterus: Normal.       Adnexa: Right adnexa normal and left adnexa normal.   Musculoskeletal:         General: Normal range of motion.      Cervical back: Normal range of motion.   Lymphadenopathy:      Upper Body:      Right upper body: No supraclavicular, axillary or pectoral adenopathy.      Left upper body: No supraclavicular, axillary or pectoral adenopathy.      Lower Body: No right inguinal adenopathy. No left inguinal adenopathy.   Skin:     General: Skin is warm and dry.   Neurological:      Mental Status: She is alert and oriented to person, place, and time.   Psychiatric:         Behavior: Behavior normal.         Thought Content: Thought content normal.         Judgment: Judgment normal.

## 2024-11-06 ENCOUNTER — RA CDI HCC (OUTPATIENT)
Dept: OTHER | Facility: HOSPITAL | Age: 24
End: 2024-11-06

## 2024-11-13 ENCOUNTER — OFFICE VISIT (OUTPATIENT)
Age: 24
End: 2024-11-13
Payer: COMMERCIAL

## 2024-11-13 VITALS
DIASTOLIC BLOOD PRESSURE: 74 MMHG | OXYGEN SATURATION: 98 % | WEIGHT: 118 LBS | HEIGHT: 63 IN | SYSTOLIC BLOOD PRESSURE: 104 MMHG | TEMPERATURE: 99.2 F | BODY MASS INDEX: 20.91 KG/M2 | HEART RATE: 78 BPM

## 2024-11-13 DIAGNOSIS — F32.2 SEVERE MAJOR DEPRESSIVE DISORDER (HCC): Primary | ICD-10-CM

## 2024-11-13 DIAGNOSIS — H91.93 BILATERAL HEARING LOSS, UNSPECIFIED HEARING LOSS TYPE: ICD-10-CM

## 2024-11-13 DIAGNOSIS — R42 VERTIGO: ICD-10-CM

## 2024-11-13 DIAGNOSIS — H93.13 TINNITUS OF BOTH EARS: ICD-10-CM

## 2024-11-13 PROBLEM — H91.90 HEARING LOSS: Status: ACTIVE | Noted: 2024-11-13

## 2024-11-13 PROCEDURE — 99214 OFFICE O/P EST MOD 30 MIN: CPT | Performed by: FAMILY MEDICINE

## 2024-11-13 RX ORDER — MECLIZINE HCL 12.5 MG 12.5 MG/1
12.5 TABLET ORAL EVERY 8 HOURS PRN
Qty: 30 TABLET | Refills: 0 | Status: SHIPPED | OUTPATIENT
Start: 2024-11-13

## 2024-11-14 ENCOUNTER — TELEPHONE (OUTPATIENT)
Age: 24
End: 2024-11-14

## 2024-11-14 NOTE — TELEPHONE ENCOUNTER
PA for meclizine (ANTIVERT) 12.5 MG tablet SUBMITTED to [facility]     via    []CMM-KEY:    [x]Surescripts-Case ID # [id number]  []Availity-Auth ID #  NDC #    []Faxed to plan   []Other website    []Phone call Case ID #      []PA sent as URGENT    All office notes, labs and other pertaining documents and studies sent. Clinical questions answered. Awaiting determination from insurance company.     Turnaround time for your insurance to make a decision on your Prior Authorization can take 7-21 business days.

## 2024-11-15 NOTE — PROGRESS NOTES
Name: Colleen Alcaraz      : 2000      MRN: [id number]  Encounter Provider: Clive Garcia MD  Encounter Date: 2024   Encounter department: [facility]  :  Assessment & Plan  Severe major depressive disorder (HCC)  Discussed workup and treatment options patient plans to continue current treatment and monitor will call back if she changes her mind about trying something different         Vertigo  Discussed workup or treatment options will refer to vestibular therapy along with ENT and given meclizine as needed discussed supportive care return parameters otherwise  Orders:    Ambulatory Referral to Physical Therapy; Future    Ambulatory Referral to Otolaryngology; Future    meclizine (ANTIVERT) 12.5 MG tablet; Take 1 tablet (12.5 mg total) by mouth every 8 (eight) hours as needed for dizziness    Tinnitus of both ears  Discussed workup or treatment options will refer to vestibular therapy along with ENT and given meclizine as needed discussed supportive care return parameters otherwise  Orders:    Ambulatory Referral to Physical Therapy; Future    Ambulatory Referral to Otolaryngology; Future    Bilateral hearing loss, unspecified hearing loss type  Discussed workup or treatment options will refer to vestibular therapy along with ENT and given meclizine as needed discussed supportive care return parameters otherwise  Orders:    Ambulatory Referral to Physical Therapy; Future    Ambulatory Referral to Otolaryngology; Future           History of Present Illness     Patient is a 24-year-old woman who presents for follow-up on major depressive disorder vertigo with tinnitus and hearing loss progressive over several years no fevers chills nausea vomiting      Review of Systems   Constitutional: Negative.    HENT: Negative.     Eyes: Negative.    Respiratory: Negative.     Cardiovascular: Negative.    Gastrointestinal: Negative.    Endocrine: Negative.    Genitourinary:  "Negative.    Musculoskeletal: Negative.    Allergic/Immunologic: Negative.    Neurological:  Positive for dizziness.   Hematological: Negative.    Psychiatric/Behavioral:  Positive for dysphoric mood. The patient is nervous/anxious.    All other systems reviewed and are negative.         Objective   /74 (BP Location: Right arm, Patient Position: Sitting, Cuff Size: Standard)   Pulse 78   Temp 99.2 °F (37.3 °C) (Temporal)   Ht 5' 3\" (1.6 m)   Wt 53.5 kg (118 lb)   LMP 10/21/2024 (Exact Date)   SpO2 98%   BMI 20.90 kg/m²      Physical Exam  Constitutional:       General: She is not in acute distress.     Appearance: She is well-developed. She is not diaphoretic.   HENT:      Head: Normocephalic and atraumatic.      Right Ear: External ear normal.      Left Ear: External ear normal.      Nose: Nose normal.      Mouth/Throat:      Pharynx: No oropharyngeal exudate.   Eyes:      General:         Right eye: No discharge.         Left eye: No discharge.      Conjunctiva/sclera: Conjunctivae normal.      Pupils: Pupils are equal, round, and reactive to light.   Neck:      Thyroid: No thyromegaly.      Trachea: No tracheal deviation.   Cardiovascular:      Rate and Rhythm: Normal rate and regular rhythm.      Heart sounds: Normal heart sounds. No murmur heard.     No friction rub. No gallop.   Pulmonary:      Effort: Pulmonary effort is normal. No respiratory distress.      Breath sounds: Normal breath sounds.   Abdominal:      General: There is no distension.      Palpations: Abdomen is soft.      Tenderness: There is no abdominal tenderness. There is no guarding or rebound.   Musculoskeletal:         General: Normal range of motion.   Lymphadenopathy:      Cervical: No cervical adenopathy.   Skin:     General: Skin is warm.   Neurological:      Mental Status: She is alert and oriented to person, place, and time.      Cranial Nerves: No cranial nerve deficit.   Psychiatric:         Behavior: Behavior normal.    "      Thought Content: Thought content normal.         Judgment: Judgment normal.

## 2024-11-15 NOTE — ASSESSMENT & PLAN NOTE
Discussed workup or treatment options will refer to vestibular therapy along with ENT and given meclizine as needed discussed supportive care return parameters otherwise  Orders:    Ambulatory Referral to Physical Therapy; Future    Ambulatory Referral to Otolaryngology; Future    meclizine (ANTIVERT) 12.5 MG tablet; Take 1 tablet (12.5 mg total) by mouth every 8 (eight) hours as needed for dizziness

## 2024-11-15 NOTE — ASSESSMENT & PLAN NOTE
Discussed workup and treatment options patient plans to continue current treatment and monitor will call back if she changes her mind about trying something different

## 2024-11-15 NOTE — ASSESSMENT & PLAN NOTE
Discussed workup or treatment options will refer to vestibular therapy along with ENT and given meclizine as needed discussed supportive care return parameters otherwise  Orders:    Ambulatory Referral to Physical Therapy; Future    Ambulatory Referral to Otolaryngology; Future

## 2024-11-15 NOTE — TELEPHONE ENCOUNTER
PA for meclizine (ANTIVERT) 12.5 MG tablet  DENIED    Reason:(Screenshot if applicable)        Message sent to office clinical pool Yes    Denial letter scanned into Media No see above    Appeal started No (Provider will need to decide if appeal is warranted and send clinical documentation to Prior Authorization Team for initiation.)    **Please follow up with your patient regarding denial and next steps**

## 2024-12-31 ENCOUNTER — APPOINTMENT (EMERGENCY)
Dept: RADIOLOGY | Facility: HOSPITAL | Age: 24
End: 2024-12-31
Payer: COMMERCIAL

## 2024-12-31 ENCOUNTER — HOSPITAL ENCOUNTER (EMERGENCY)
Facility: HOSPITAL | Age: 24
Discharge: HOME | End: 2024-12-31
Attending: EMERGENCY MEDICINE
Payer: COMMERCIAL

## 2024-12-31 VITALS
DIASTOLIC BLOOD PRESSURE: 79 MMHG | TEMPERATURE: 98.4 F | HEART RATE: 84 BPM | SYSTOLIC BLOOD PRESSURE: 120 MMHG | OXYGEN SATURATION: 98 % | RESPIRATION RATE: 20 BRPM

## 2024-12-31 DIAGNOSIS — R42 DIZZINESS: Primary | ICD-10-CM

## 2024-12-31 LAB
ALBUMIN SERPL BCG-MCNC: 4.6 G/DL (ref 3.5–5)
ALP SERPL-CCNC: 56 U/L (ref 34–104)
ALT SERPL W P-5'-P-CCNC: 25 U/L (ref 7–52)
ANION GAP SERPL CALCULATED.3IONS-SCNC: 5 MMOL/L (ref 4–13)
AST SERPL W P-5'-P-CCNC: 16 U/L (ref 13–39)
ATRIAL RATE: 80 BPM
BASOPHILS # BLD AUTO: 0.02 THOUSANDS/ΜL (ref 0–0.1)
BASOPHILS NFR BLD AUTO: 0 % (ref 0–1)
BILIRUB SERPL-MCNC: 0.65 MG/DL (ref 0.2–1)
BILIRUB UR QL STRIP: NEGATIVE
BUN SERPL-MCNC: 13 MG/DL (ref 5–25)
CALCIUM SERPL-MCNC: 9.9 MG/DL (ref 8.4–10.2)
CARDIAC TROPONIN I PNL SERPL HS: <2 NG/L (ref ?–50)
CHLORIDE SERPL-SCNC: 104 MMOL/L (ref 96–108)
CLARITY UR: CLEAR
CO2 SERPL-SCNC: 31 MMOL/L (ref 21–32)
COLOR UR: COLORLESS
CREAT SERPL-MCNC: 0.87 MG/DL (ref 0.6–1.3)
EOSINOPHIL # BLD AUTO: 0.07 THOUSAND/ΜL (ref 0–0.61)
EOSINOPHIL NFR BLD AUTO: 1 % (ref 0–6)
ERYTHROCYTE [DISTWIDTH] IN BLOOD BY AUTOMATED COUNT: 12 % (ref 11.6–15.1)
EXT PREGNANCY TEST URINE: NEGATIVE
EXT. CONTROL: NORMAL
GFR SERPL CREATININE-BSD FRML MDRD: 93 ML/MIN/1.73SQ M
GLUCOSE SERPL-MCNC: 79 MG/DL (ref 65–140)
GLUCOSE UR STRIP-MCNC: NEGATIVE MG/DL
HCT VFR BLD AUTO: 40.4 % (ref 34.8–46.1)
HGB BLD-MCNC: 13.6 G/DL (ref 11.5–15.4)
HGB UR QL STRIP.AUTO: NEGATIVE
IMM GRANULOCYTES # BLD AUTO: 0.01 THOUSAND/UL (ref 0–0.2)
IMM GRANULOCYTES NFR BLD AUTO: 0 % (ref 0–2)
KETONES UR STRIP-MCNC: NEGATIVE MG/DL
LEUKOCYTE ESTERASE UR QL STRIP: NEGATIVE
LYMPHOCYTES # BLD AUTO: 1.67 THOUSANDS/ΜL (ref 0.6–4.47)
LYMPHOCYTES NFR BLD AUTO: 32 % (ref 14–44)
MCH RBC QN AUTO: 31.3 PG (ref 26.8–34.3)
MCHC RBC AUTO-ENTMCNC: 33.7 G/DL (ref 31.4–37.4)
MCV RBC AUTO: 93 FL (ref 82–98)
MONOCYTES # BLD AUTO: 0.38 THOUSAND/ΜL (ref 0.17–1.22)
MONOCYTES NFR BLD AUTO: 7 % (ref 4–12)
NEUTROPHILS # BLD AUTO: 3.06 THOUSANDS/ΜL (ref 1.85–7.62)
NEUTS SEG NFR BLD AUTO: 60 % (ref 43–75)
NITRITE UR QL STRIP: NEGATIVE
NRBC BLD AUTO-RTO: 0 /100 WBCS
P AXIS: 81 DEGREES
PH UR STRIP.AUTO: 7 [PH]
PLATELET # BLD AUTO: 234 THOUSANDS/UL (ref 149–390)
PMV BLD AUTO: 9 FL (ref 8.9–12.7)
POTASSIUM SERPL-SCNC: 4 MMOL/L (ref 3.5–5.3)
PR INTERVAL: 146 MS
PROT SERPL-MCNC: 7.3 G/DL (ref 6.4–8.4)
PROT UR STRIP-MCNC: NEGATIVE MG/DL
QRS AXIS: 84 DEGREES
QRSD INTERVAL: 82 MS
QT INTERVAL: 358 MS
QTC INTERVAL: 413 MS
RBC # BLD AUTO: 4.35 MILLION/UL (ref 3.81–5.12)
SODIUM SERPL-SCNC: 140 MMOL/L (ref 135–147)
SP GR UR STRIP.AUTO: 1 (ref 1–1.03)
T WAVE AXIS: 53 DEGREES
UROBILINOGEN UR STRIP-ACNC: <2 MG/DL
VENTRICULAR RATE: 80 BPM
WBC # BLD AUTO: 5.21 THOUSAND/UL (ref 4.31–10.16)

## 2024-12-31 PROCEDURE — 71045 X-RAY EXAM CHEST 1 VIEW: CPT

## 2024-12-31 PROCEDURE — 85025 COMPLETE CBC W/AUTO DIFF WBC: CPT | Performed by: EMERGENCY MEDICINE

## 2024-12-31 PROCEDURE — 80053 COMPREHEN METABOLIC PANEL: CPT | Performed by: EMERGENCY MEDICINE

## 2024-12-31 PROCEDURE — 36415 COLL VENOUS BLD VENIPUNCTURE: CPT

## 2024-12-31 PROCEDURE — 93005 ELECTROCARDIOGRAM TRACING: CPT

## 2024-12-31 PROCEDURE — 84484 ASSAY OF TROPONIN QUANT: CPT | Performed by: EMERGENCY MEDICINE

## 2024-12-31 PROCEDURE — 93010 ELECTROCARDIOGRAM REPORT: CPT | Performed by: INTERNAL MEDICINE

## 2024-12-31 PROCEDURE — 81003 URINALYSIS AUTO W/O SCOPE: CPT

## 2024-12-31 PROCEDURE — 99284 EMERGENCY DEPT VISIT MOD MDM: CPT | Performed by: EMERGENCY MEDICINE

## 2024-12-31 PROCEDURE — 99284 EMERGENCY DEPT VISIT MOD MDM: CPT

## 2024-12-31 PROCEDURE — 81025 URINE PREGNANCY TEST: CPT

## 2024-12-31 RX ORDER — MAGNESIUM SULFATE HEPTAHYDRATE 40 MG/ML
2 INJECTION, SOLUTION INTRAVENOUS ONCE
Status: DISCONTINUED | OUTPATIENT
Start: 2024-12-31 | End: 2024-12-31

## 2024-12-31 RX ORDER — MECLIZINE HYDROCHLORIDE 25 MG/1
25 TABLET ORAL ONCE
Status: COMPLETED | OUTPATIENT
Start: 2024-12-31 | End: 2024-12-31

## 2024-12-31 RX ORDER — KETOROLAC TROMETHAMINE 30 MG/ML
30 INJECTION, SOLUTION INTRAMUSCULAR; INTRAVENOUS ONCE
Status: DISCONTINUED | OUTPATIENT
Start: 2024-12-31 | End: 2024-12-31

## 2024-12-31 RX ORDER — METOCLOPRAMIDE HYDROCHLORIDE 5 MG/ML
10 INJECTION INTRAMUSCULAR; INTRAVENOUS ONCE
Status: DISCONTINUED | OUTPATIENT
Start: 2024-12-31 | End: 2024-12-31

## 2024-12-31 RX ORDER — MECLIZINE HCL 12.5 MG 12.5 MG/1
12.5 TABLET ORAL 3 TIMES DAILY PRN
Qty: 30 TABLET | Refills: 0 | Status: SHIPPED | OUTPATIENT
Start: 2024-12-31

## 2024-12-31 RX ADMIN — MECLIZINE HYDROCHLORIDE 25 MG: 25 TABLET ORAL at 12:39

## 2024-12-31 NOTE — ED ATTENDING ATTESTATION
12/31/2024  I, Alicia Burnham MD, saw and evaluated the patient. I have discussed the patient with the resident/non-physician practitioner and agree with the resident's/non-physician practitioner's findings, Plan of Care, and MDM as documented in the resident's/non-physician practitioner's note, except where noted. All available labs and Radiology studies were reviewed.  I was present for key portions of any procedure(s) performed by the resident/non-physician practitioner and I was immediately available to provide assistance.       At this point I agree with the current assessment done in the Emergency Department.  I have conducted an independent evaluation of this patient a history and physical is as follows:    ED Course         Critical Care Time  Procedures    25 yo female with hx of pots, pandas, here for spells of dizziness, lightheadedness started last night while in bed.  Pt gets ocular migraines and having it more frequently recently. Pt with no current n/v/d. No abdominal pain.  Lmp 12/22.  Vss, afebrile, lungs cta, rrr, abdomen soft nontender, no neuro deficits, no nystagmus.  Cardiac workup.  Migraine meds, ivf.

## 2024-12-31 NOTE — Clinical Note
Colleen Traylorlailas was seen and treated in our emergency department on 12/31/2024.    No restrictions            Diagnosis:     Colleen  .    She may return on this date: 01/03/2025         If you have any questions or concerns, please don't hesitate to call.      Mono Berry DO    ______________________________           _______________          _______________  Hospital Representative                              Date                                Time

## 2024-12-31 NOTE — ED PROVIDER NOTES
ED Disposition       None          Assessment & Plan   {Hyperlinks  Risk Stratification - NIHSS - HEART SCORE - Fill out sepsis note and make sure you call [phone] if severe or septic shock:[id number]}    Medical Decision Making  24 year old female with pmhx of POTS, PANDAS, Migraines, visual-spatial processing dysfunction presents to the ED for evaluation of room spinning dizziness, visual migraines the past few days. Pt typically gets visual migraine 3 times a year but have hd three this month. Pt also reports sensation of pins and needles in her hands. One episode of loose stool. Pt reports difficulty ambulating secondary to the dizziness and lightheaded. Normal appetite. No fever, chills, vomiting, dysuria.   First day of Last menstrual cycle was 12/22/24                 Medications - No data to display    ED Risk Strat Scores                                              History of Present Illness   {Hyperlinks  History (Med, Surg, Fam, Social) - Current Medications - Allergies  :[id number]}    Chief Complaint   Patient presents with    Dizziness     Last night started with veritgo and hearing loss in right ear with numbness in b/l hands. Patient woke up this morning with some improvement, but with sudden head and arm movements the room starts to spin. Patient reports she does have dizzy spells, but these are different. H/o visual-spatial processing dysfunction and POTS.  Pt reports h/o visual migraines, but they are increasing this past month.        Past Medical History:   Diagnosis Date    Allergic 1/1/2008    Annual physical exam 4/26/2023    Annual physical exam 04/26/2023    Depression 06/1/2014    Migraine August 2014    I had severe migraines in high school, I rarely get migraines now but have started getting ocular migraines in the past year    Polycystic ovary syndrome 2016    ultrasound    Visual impairment 1/1/2015    Visual-spatial processing dysfunction and difficulty with convergence      Past  Surgical History:   Procedure Laterality Date    TONSILECTOMY AND ADNOIDECTOMY      WISDOM TOOTH EXTRACTION Bilateral 03/2024    patient had to have jaw surgery to have widom teeth removed      Family History   Problem Relation Age of Onset    Mental illness Father     Depression Father     Anxiety disorder Father     OCD Father     Seizures Brother         PANDAS/Auto-immune incephalitis    Autoimmune disease Brother         Autoimmune incephalitis and PANDAS    OCD Brother     Psychosis Brother     Vision loss Maternal Aunt     Thyroid disease unspecified Maternal Grandmother     Osteoporosis Paternal Grandmother     Diabetes type II Family       Social History     Tobacco Use    Smoking status: Never    Smokeless tobacco: Never   Vaping Use    Vaping status: Never Used   Substance Use Topics    Alcohol use: Not Currently     Comment: social    Drug use: Never      E-Cigarette/Vaping    E-Cigarette Use Never User       E-Cigarette/Vaping Substances    Nicotine No     THC No     CBD No     Flavoring No     Other No     Unknown No       I have reviewed and agree with the history as documented.     HPI    Review of Systems        Objective   {Hyperlinks  Historical Vitals - Historical Labs - Chart Review/Microbiology - Last Echo - Code Status  :[id number]}    ED Triage Vitals [12/31/24 1019]   Temperature Pulse Blood Pressure Respirations SpO2 Patient Position - Orthostatic VS   98.4 °F (36.9 °C) 84 120/79 20 98 % Sitting      Temp Source Heart Rate Source BP Location FiO2 (%) Pain Score    Oral Monitor Left arm -- No Pain      Vitals      Date and Time Temp Pulse SpO2 Resp BP Pain Score FACES Pain Rating User   12/31/24 1019 98.4 °F (36.9 °C) 84 98 % 20 120/79 No Pain -- ST            Physical Exam    Results Reviewed       Procedure Component Value Units Date/Time    Comprehensive metabolic panel [[id number]] Collected: 12/31/24 1025    Lab Status: Final result Specimen: Blood from Arm, Left Updated: 12/31/24 1102      Sodium 140 mmol/L      Potassium 4.0 mmol/L      Chloride 104 mmol/L      CO2 31 mmol/L      ANION GAP 5 mmol/L      BUN 13 mg/dL      Creatinine 0.87 mg/dL      Glucose 79 mg/dL      Calcium 9.9 mg/dL      AST 16 U/L      ALT 25 U/L      Alkaline Phosphatase 56 U/L      Total Protein 7.3 g/dL      Albumin 4.6 g/dL      Total Bilirubin 0.65 mg/dL      eGFR 93 ml/min/1.73sq m     Narrative:      National Kidney Disease Foundation guidelines for Chronic Kidney Disease (CKD):     Stage 1 with normal or high GFR (GFR > 90 mL/min/1.73 square meters)    Stage 2 Mild CKD (GFR = 60-89 mL/min/1.73 square meters)    Stage 3A Moderate CKD (GFR = 45-59 mL/min/1.73 square meters)    Stage 3B Moderate CKD (GFR = 30-44 mL/min/1.73 square meters)    Stage 4 Severe CKD (GFR = 15-29 mL/min/1.73 square meters)    Stage 5 End Stage CKD (GFR <15 mL/min/1.73 square meters)  Note: GFR calculation is accurate only with a steady state creatinine    HS Troponin I 2hr [[id number]]     Lab Status: No result Specimen: Blood     HS Troponin 0hr (reflex protocol) [[id number]]  (Normal) Collected: 12/31/24 1025    Lab Status: Final result Specimen: Blood from Arm, Left Updated: 12/31/24 1058     hs TnI 0hr <2 ng/L     CBC and differential [[id number]] Collected: 12/31/24 1025    Lab Status: Final result Specimen: Blood from Arm, Left Updated: 12/31/24 1037     WBC 5.21 Thousand/uL      RBC 4.35 Million/uL      Hemoglobin 13.6 g/dL      Hematocrit 40.4 %      MCV 93 fL      MCH 31.3 pg      MCHC 33.7 g/dL      RDW 12.0 %      MPV 9.0 fL      Platelets 234 Thousands/uL      nRBC 0 /100 WBCs      Segmented % 60 %      Immature Grans % 0 %      Lymphocytes % 32 %      Monocytes % 7 %      Eosinophils Relative 1 %      Basophils Relative 0 %      Absolute Neutrophils 3.06 Thousands/µL      Absolute Immature Grans 0.01 Thousand/uL      Absolute Lymphocytes 1.67 Thousands/µL      Absolute Monocytes 0.38 Thousand/µL      Eosinophils Absolute 0.07  Thousand/µL      Basophils Absolute 0.02 Thousands/µL             XR chest 1 view portable    (Results Pending)       Procedures    ED Medication and Procedure Management   Prior to Admission Medications   Prescriptions Last Dose Informant Patient Reported? Taking?   Blood Glucose Monitoring Suppl (OneTouch Verio) w/Device KIT   No No   Sig: Use in the morning   OneTouch Delica Lancets 33G MISC   No No   Sig: Use daily   chlorpheniramine (CHLOR-TRIMETON) 2 MG/5ML syrup   No No   Sig: Take 5 mL (2 mg total) by mouth every 4 (four) hours as needed for allergies   glucose blood (OneTouch Verio) test strip   No No   Sig: Use as instructed   meclizine (ANTIVERT) 12.5 MG tablet   No No   Sig: Take 1 tablet (12.5 mg total) by mouth every 8 (eight) hours as needed for dizziness      Facility-Administered Medications: None     Patient's Medications   Discharge Prescriptions    No medications on file     No discharge procedures on file.  ED SEPSIS DOCUMENTATION          from Arm, Left Updated: 12/31/24 1102     Sodium 140 mmol/L      Potassium 4.0 mmol/L      Chloride 104 mmol/L      CO2 31 mmol/L      ANION GAP 5 mmol/L      BUN 13 mg/dL      Creatinine 0.87 mg/dL      Glucose 79 mg/dL      Calcium 9.9 mg/dL      AST 16 U/L      ALT 25 U/L      Alkaline Phosphatase 56 U/L      Total Protein 7.3 g/dL      Albumin 4.6 g/dL      Total Bilirubin 0.65 mg/dL      eGFR 93 ml/min/1.73sq m     Narrative:      National Kidney Disease Foundation guidelines for Chronic Kidney Disease (CKD):     Stage 1 with normal or high GFR (GFR > 90 mL/min/1.73 square meters)    Stage 2 Mild CKD (GFR = 60-89 mL/min/1.73 square meters)    Stage 3A Moderate CKD (GFR = 45-59 mL/min/1.73 square meters)    Stage 3B Moderate CKD (GFR = 30-44 mL/min/1.73 square meters)    Stage 4 Severe CKD (GFR = 15-29 mL/min/1.73 square meters)    Stage 5 End Stage CKD (GFR <15 mL/min/1.73 square meters)  Note: GFR calculation is accurate only with a steady state creatinine    HS Troponin 0hr (reflex protocol) [[id number]]  (Normal) Collected: 12/31/24 1025    Lab Status: Final result Specimen: Blood from Arm, Left Updated: 12/31/24 1058     hs TnI 0hr <2 ng/L     CBC and differential [[id number]] Collected: 12/31/24 1025    Lab Status: Final result Specimen: Blood from Arm, Left Updated: 12/31/24 1037     WBC 5.21 Thousand/uL      RBC 4.35 Million/uL      Hemoglobin 13.6 g/dL      Hematocrit 40.4 %      MCV 93 fL      MCH 31.3 pg      MCHC 33.7 g/dL      RDW 12.0 %      MPV 9.0 fL      Platelets 234 Thousands/uL      nRBC 0 /100 WBCs      Segmented % 60 %      Immature Grans % 0 %      Lymphocytes % 32 %      Monocytes % 7 %      Eosinophils Relative 1 %      Basophils Relative 0 %      Absolute Neutrophils 3.06 Thousands/µL      Absolute Immature Grans 0.01 Thousand/uL      Absolute Lymphocytes 1.67 Thousands/µL      Absolute Monocytes 0.38 Thousand/µL      Eosinophils Absolute 0.07 Thousand/µL      Basophils Absolute 0.02  Thousands/µL             XR chest 1 view portable   Final Interpretation by Arsen Edwards MD (12/31 1625)      No acute cardiopulmonary disease.            Workstation performed: [id number]             Procedures    ED Medication and Procedure Management   Prior to Admission Medications   Prescriptions Last Dose Informant Patient Reported? Taking?   Blood Glucose Monitoring Suppl (OneTouch Verio) w/Device KIT   No No   Sig: Use in the morning   OneTouch Delica Lancets 33G MISC   No No   Sig: Use daily   chlorpheniramine (CHLOR-TRIMETON) 2 MG/5ML syrup   No No   Sig: Take 5 mL (2 mg total) by mouth every 4 (four) hours as needed for allergies   glucose blood (OneTouch Verio) test strip   No No   Sig: Use as instructed   meclizine (ANTIVERT) 12.5 MG tablet   No No   Sig: Take 1 tablet (12.5 mg total) by mouth every 8 (eight) hours as needed for dizziness      Facility-Administered Medications: None     Discharge Medication List as of 12/31/2024  1:21 PM        START taking these medications    Details   !! meclizine (ANTIVERT) 12.5 MG tablet Take 1 tablet (12.5 mg total) by mouth 3 (three) times a day as needed for dizziness, Starting Tue 12/31/2024, Normal       !! - Potential duplicate medications found. Please discuss with provider.        CONTINUE these medications which have NOT CHANGED    Details   Blood Glucose Monitoring Suppl (OneTouch Verio) w/Device KIT Use in the morning, Starting Mon 1/15/2024, Normal      chlorpheniramine (CHLOR-TRIMETON) 2 MG/5ML syrup Take 5 mL (2 mg total) by mouth every 4 (four) hours as needed for allergies, Starting Thu 12/8/2022, No Print      glucose blood (OneTouch Verio) test strip Use as instructed, Normal      !! meclizine (ANTIVERT) 12.5 MG tablet Take 1 tablet (12.5 mg total) by mouth every 8 (eight) hours as needed for dizziness, Starting Wed 11/13/2024, Normal      OneTouch Delica Lancets 33G MISC Use daily, Starting Tue 2/6/2024, Normal       !! - Potential  duplicate medications found. Please discuss with provider.          ED SEPSIS DOCUMENTATION   Time reflects when diagnosis was documented in both MDM as applicable and the Disposition within this note       Time User Action Codes Description Comment    12/31/2024  1:17 PM Mono Berry Add [R42] Dizziness                  Mono Berry DO  01/11/25 1537

## 2024-12-31 NOTE — DISCHARGE INSTRUCTIONS
You were evaluated in the Emergency Department today for dizziness.     Take meclizine twice a day.     Please schedule an appointment with neurology within the next 2-3 days.    Return to the Emergency Department if you experience worsening or uncontrolled pain, fevers 100.4°F or greater, recurrent vomiting, inability to tolerate food or fluids by mouth, bloody stools or vomit, black or tarry stools, or any other concerning symptoms.    Thank you for choosing us for your care.

## 2025-01-01 ENCOUNTER — PATIENT MESSAGE (OUTPATIENT)
Dept: NEUROLOGY | Facility: CLINIC | Age: 25
End: 2025-01-01

## 2025-01-13 ENCOUNTER — NURSE TRIAGE (OUTPATIENT)
Age: 25
End: 2025-01-13

## 2025-01-13 NOTE — TELEPHONE ENCOUNTER
Colleen Alcaraz (sent my chart  msg)      Hello,  The past year my spatial processing symptoms have been getting worse, and I had an increase in visual migraines this month followed by an abrupt onset vertigo the other day that I went to the hospital for. I was told to follow up with neurology, but as you know my MRIs/scans have been historically clear and we have had discussions in the past about my daily symptoms being ambiguos. During my last appointment, the idea of going to an autonomic dysfunction specialist was raised, and I believe there was at a specific center or special facility mentioned, but I do not have the information, and I was wondering if I could please get a referral to the place you had in mind. Thank you    ____________________________    I spoke to pt per above my chart msg as pt has not been seen since 12/15/23 and I wanted to get more insight of her symptoms to schedule pt an appt with Dr Carlos.    During call pt was agreeable to triage to get insight ( see below). However, she informed her last Visual field migraine (not a painful migraine) was the week prior to New Years day (when she sent msg). She has no migraines since then and denies any visual field auras today.    I offered pt an appt with Dr Carlos to discuss.    Patient does not feel she needs an appt with Dr Carlos, as  her visual field migraines are the same when she seen provider last in Dec 2023. During that appt pt was informed her MRI/Scans were all normal and advise that there is an autonomic dysfunction specialists she can see as she also as processing issues besides migraines. Pt was advised at that visit to also go to Physical Therapy which she did at that time as she has hx of POTS as well. Pt was not ready to see that specialist at that time, as she had a lot going on, but she is ready now and requesting if provider can provide her just that referral information , or does she feel she needs to see pt  "again?    Routed to provider to advise if agreeable to provide this info, as pt requesting a referral to be placed to inform who she would need to see etc. Or does pt need to see provider to get this info?    Please send pt a my chart msg with Dr Carlos's update, please do not call pt number with update, per pts request.          Reason for Disposition   Similar to previously diagnosed migraine headaches    Answer Assessment - Initial Assessment Questions  1. LOCATION: \"Where does it hurt?\"       Primarily visual field  area , pt takes excedrin  migraine at that time and once aura complete  about 45 min. Pt does not take Excedrin due to pain, she takes it to prevent any pain, as she does not have pain during migraine.   2. ONSET: \"When did the headache start?\" (e.g., minutes, hours, days)       Week before New years day  3. PATTERN: \"Does the pain come and go, or has it been constant since it started?\"      Comes and goes, past year she gets the visual field migraine denies the painful migraines that she had years prior  4. SEVERITY: \"How bad is the pain?\" and \"What does it keep you from doing?\"  (e.g., Scale 1-10; mild, moderate, or severe)     No pain during visual field migraine  5. RECURRENT SYMPTOM: \"Have you ever had headaches before?\" If Yes, ask: \"When was the last time?\" and \"What happened that time?\"       Yes, week before New years day, when pt sent msg  6. CAUSE: \"What do you think is causing the headache?\"      Poor sleep or poor hydration typically causes them, so she tries to stay on top of them  7. MIGRAINE: \"Have you been diagnosed with migraine headaches?\" If Yes, ask: \"Is this headache similar?\"       Yes visual field migraines only no painful  migraines  8. HEAD INJURY: \"Has there been any recent injury to the head?\"       no  9. OTHER SYMPTOMS: \"Do you have any other symptoms?\" (e.g., fever, stiff neck, eye pain, sore throat, cold symptoms)      Sometimes she gets light sensitivity. After the " "visual field aura she usually gets shaking hands afterwards last only a few hours and goes away. Per pt Dr Carlos is aware and during last office visit it was found that its from her aura and no other neurological cause since all her past MRI/scans were normal  10. PREGNANCY: \"Is there any chance you are pregnant?\" \"When was your last menstrual period?\"        No, LMP 12/22/24. Does not feel visual field auras are from Menses. Pt does get hand tremors during menses, pt has an OBGYN appt next week for f.u due to those symptoms. As the entire 2nd day of menses pt has hand tremors that entire day. So that is why pt is seeing OBGYN    Protocols used: Headache-Adult-[place]    "

## 2025-01-17 NOTE — TELEPHONE ENCOUNTER
Pt called back in regarding the message above. She wants to speak with the provider on the symptoms.

## 2025-01-22 ENCOUNTER — OFFICE VISIT (OUTPATIENT)
Dept: OBGYN CLINIC | Facility: MEDICAL CENTER | Age: 25
End: 2025-01-22
Payer: COMMERCIAL

## 2025-01-22 VITALS
DIASTOLIC BLOOD PRESSURE: 60 MMHG | SYSTOLIC BLOOD PRESSURE: 98 MMHG | BODY MASS INDEX: 20.7 KG/M2 | HEIGHT: 63 IN | WEIGHT: 116.8 LBS

## 2025-01-22 DIAGNOSIS — F32.81 PMDD (PREMENSTRUAL DYSPHORIC DISORDER): ICD-10-CM

## 2025-01-22 DIAGNOSIS — E28.2 PCOS (POLYCYSTIC OVARIAN SYNDROME): Primary | ICD-10-CM

## 2025-01-22 PROCEDURE — 99214 OFFICE O/P EST MOD 30 MIN: CPT | Performed by: OBSTETRICS & GYNECOLOGY

## 2025-01-22 NOTE — PROGRESS NOTES
Name: Colleen Alcaraz      : 2000      MRN: [id number]  Encounter Provider: Analilia Campuzano MD  Encounter Date: 2025   Encounter department: [facility]  :  Assessment & Plan  PCOS (polycystic ovarian syndrome)    Orders:    Drospirenone 4 MG TABS; Take 4 mg by mouth in the morning    PMDD (premenstrual dysphoric disorder)    Orders:    Drospirenone 4 MG TABS; Take 4 mg by mouth in the morning        History of Present Illness   HPI  Colleen Alcaraz is a 24 y.o. female who presents for discussion of birth control.  Patient has a history of painful menses as well as PMDD with her cycles.  We have previously discussed birth control.  Patient does have a history of migraines with aura, therefore combined oral contraceptives is not an option.  We discussed progestin only options including pill, Depo, Nexplanon, and IUD.  Patient interested in starting the pill.  Prescription for drospirenone sent to her pharmacy.  Savings card was provided to the patient as well.  All questions answered.  Recommend follow-up in 3 months.  History obtained from: patient    Review of Systems   Constitutional: Negative.    HENT: Negative.     Eyes: Negative.    Respiratory: Negative.     Cardiovascular: Negative.    Gastrointestinal: Negative.    Genitourinary:  Positive for menstrual problem.   Musculoskeletal: Negative.    All other systems reviewed and are negative.    Current Outpatient Medications on File Prior to Visit   Medication Sig Dispense Refill    Blood Glucose Monitoring Suppl (OneTouch Verio) w/Device KIT Use in the morning 1 kit 1    chlorpheniramine (CHLOR-TRIMETON) 2 MG/5ML syrup Take 5 mL (2 mg total) by mouth every 4 (four) hours as needed for allergies 118 mL 0    glucose blood (OneTouch Verio) test strip Use as instructed 25 each 3    meclizine (ANTIVERT) 12.5 MG tablet Take 1 tablet (12.5 mg total) by mouth 3 (three) times a day as needed for dizziness 30  "tablet 0    OneTouch Delica Lancets 33G MISC Use daily 100 each 1    [DISCONTINUED] meclizine (ANTIVERT) 12.5 MG tablet Take 1 tablet (12.5 mg total) by mouth every 8 (eight) hours as needed for dizziness 30 tablet 0     No current facility-administered medications on file prior to visit.      Social History     Tobacco Use    Smoking status: Never    Smokeless tobacco: Never   Vaping Use    Vaping status: Never Used   Substance and Sexual Activity    Alcohol use: Not Currently     Comment: social    Drug use: Never    Sexual activity: Not Currently     Partners: Male     Birth control/protection: Abstinence        Objective   BP 98/60   Ht 5' 3\" (1.6 m)   Wt 53 kg (116 lb 12.8 oz)   LMP 01/21/2025 (Approximate)   BMI 20.69 kg/m²      Physical Exam  Vitals and nursing note reviewed.   Constitutional:       General: She is not in acute distress.     Appearance: She is well-developed.   HENT:      Head: Normocephalic and atraumatic.   Eyes:      Conjunctiva/sclera: Conjunctivae normal.   Cardiovascular:      Rate and Rhythm: Normal rate and regular rhythm.      Heart sounds: No murmur heard.  Pulmonary:      Effort: Pulmonary effort is normal. No respiratory distress.      Breath sounds: Normal breath sounds.   Abdominal:      Palpations: Abdomen is soft.      Tenderness: There is no abdominal tenderness.   Musculoskeletal:         General: No swelling.      Cervical back: Neck supple.   Skin:     General: Skin is warm and dry.      Capillary Refill: Capillary refill takes less than 2 seconds.   Neurological:      Mental Status: She is alert.   Psychiatric:         Mood and Affect: Mood normal.           "

## 2025-01-29 ENCOUNTER — OFFICE VISIT (OUTPATIENT)
Age: 25
End: 2025-01-29
Payer: COMMERCIAL

## 2025-01-29 VITALS
TEMPERATURE: 97.6 F | SYSTOLIC BLOOD PRESSURE: 100 MMHG | HEART RATE: 77 BPM | WEIGHT: 114.2 LBS | DIASTOLIC BLOOD PRESSURE: 70 MMHG | OXYGEN SATURATION: 100 % | HEIGHT: 63 IN | BODY MASS INDEX: 20.23 KG/M2

## 2025-01-29 DIAGNOSIS — E28.2 PCOS (POLYCYSTIC OVARIAN SYNDROME): Primary | ICD-10-CM

## 2025-01-29 DIAGNOSIS — J45.909 REACTIVE AIRWAY DISEASE WITHOUT COMPLICATION, UNSPECIFIED ASTHMA SEVERITY, UNSPECIFIED WHETHER PERSISTENT: ICD-10-CM

## 2025-01-29 DIAGNOSIS — G90.A POTS (POSTURAL ORTHOSTATIC TACHYCARDIA SYNDROME): ICD-10-CM

## 2025-01-29 DIAGNOSIS — B94.8 PANDAS (PEDIATRIC AUTOIMMUNE NEUROPSYCHIATRIC DISEASE ASSOCIATED WITH STREPTOCOCCAL INFECTION)  (HCC): ICD-10-CM

## 2025-01-29 DIAGNOSIS — F32.2 SEVERE MAJOR DEPRESSIVE DISORDER (HCC): ICD-10-CM

## 2025-01-29 DIAGNOSIS — D89.89 PANDAS (PEDIATRIC AUTOIMMUNE NEUROPSYCHIATRIC DISEASE ASSOCIATED WITH STREPTOCOCCAL INFECTION)  (HCC): ICD-10-CM

## 2025-01-29 PROCEDURE — 99214 OFFICE O/P EST MOD 30 MIN: CPT | Performed by: FAMILY MEDICINE

## 2025-01-29 NOTE — LETTER
To whom it may concern:    Patient Colleen Alcaraz (: 2000) is requested to have a handicap parking option due to underlying medical issues currently being worked up. Application with [employer] is pending, please allow her a temporary pass for 90 days, at which point will reassess.    Do not hesitate to reach out with any additional questions or concerns.    Respectfully,        Clive Garcia MD

## 2025-01-30 NOTE — ASSESSMENT & PLAN NOTE
Depression Screening Follow-up Plan: Patient's depression screening was positive with a PHQ-9 score of 8. Patient assessed for underlying major depression. They have no active suicidal ideations. Brief counseling provided and recommend additional follow-up/re-evaluation next office visit.  Stable, continue meds. Discussed supportive care and return parameters.

## 2025-01-30 NOTE — PROGRESS NOTES
Name: Colleen Alcaraz      : 2000      MRN: [id number]  Encounter Provider: Clive Garcia MD  Encounter Date: 2025   Encounter department: [facility]  :  Assessment & Plan  Severe major depressive disorder (HCC)  Depression Screening Follow-up Plan: Patient's depression screening was positive with a PHQ-9 score of 8. Patient assessed for underlying major depression. They have no active suicidal ideations. Brief counseling provided and recommend additional follow-up/re-evaluation next office visit.  Stable, continue meds. Discussed supportive care and return parameters.        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 Discussed supportive care and return parameters.        Reactive airway disease without complication, unspecified asthma severity, unspecified whether persistent  Stable, continue meds. Discussed supportive care and return parameters.        PCOS (polycystic ovarian syndrome)  Continue follow-up with GYN. Discussed supportive care and return parameters.        POTS (postural orthostatic tachycardia syndrome)  Encourage follow-up with POTS specialized center. Discussed supportive care and return parameters.               History of Present Illness   Patient is a 23 y/o woman who presents for follow-up on MDD, PANDAS, RAD, PCOS, and POTS. Pt admits being somewhat stable on meds. But still has persistent symptoms no fevers chills nausea or vomiting.    Rash  This is a recurrent problem. The current episode started in the past 7 days. The problem has been gradually improving since onset. The affected locations include the torso, abdomen, left upper leg and right upper leg. The rash is characterized by redness. She was exposed to nothing. Associated symptoms include fatigue and a sore throat. Pertinent negatives include no anorexia, congestion, cough, diarrhea, facial edema,  "fever, joint pain, rhinorrhea, shortness of breath or vomiting.     Review of Systems   Constitutional:  Positive for fatigue. Negative for fever.   HENT:  Positive for sore throat. Negative for congestion and rhinorrhea.    Eyes: Negative.  Negative for pain.   Respiratory: Negative.  Negative for cough and shortness of breath.    Cardiovascular: Negative.    Gastrointestinal: Negative.  Negative for anorexia, diarrhea and vomiting.   Endocrine: Negative.    Genitourinary: Negative.    Musculoskeletal: Negative.  Negative for joint pain.   Skin:  Positive for rash.   Allergic/Immunologic: Negative.    Neurological:  Positive for dizziness.   Hematological: Negative.    Psychiatric/Behavioral: Negative.     All other systems reviewed and are negative.      Objective   /70 (BP Location: Right arm, Patient Position: Sitting, Cuff Size: Standard)   Pulse 77   Temp 97.6 °F (36.4 °C) (Temporal)   Ht 5' 3\" (1.6 m)   Wt 51.8 kg (114 lb 3.2 oz)   LMP 01/21/2025 (Approximate)   SpO2 100%   BMI 20.23 kg/m²      Physical Exam  Constitutional:       General: She is not in acute distress.     Appearance: She is well-developed. She is not diaphoretic.   HENT:      Head: Normocephalic and atraumatic.      Right Ear: External ear normal.      Left Ear: External ear normal.      Nose: Nose normal.      Mouth/Throat:      Pharynx: No oropharyngeal exudate.   Eyes:      General:         Right eye: No discharge.         Left eye: No discharge.      Conjunctiva/sclera: Conjunctivae normal.      Pupils: Pupils are equal, round, and reactive to light.   Neck:      Thyroid: No thyromegaly.      Trachea: No tracheal deviation.   Cardiovascular:      Rate and Rhythm: Normal rate and regular rhythm.      Heart sounds: Normal heart sounds. No murmur heard.     No friction rub. No gallop.   Pulmonary:      Effort: Pulmonary effort is normal. No respiratory distress.      Breath sounds: Normal breath sounds.   Abdominal:      " General: There is no distension.      Palpations: Abdomen is soft.      Tenderness: There is no abdominal tenderness. There is no guarding or rebound.   Musculoskeletal:         General: Normal range of motion.   Lymphadenopathy:      Cervical: No cervical adenopathy.   Skin:     General: Skin is warm.   Neurological:      Mental Status: She is alert and oriented to person, place, and time.      Cranial Nerves: No cranial nerve deficit.   Psychiatric:         Behavior: Behavior normal.         Thought Content: Thought content normal.         Judgment: Judgment normal.         Answers submitted by the patient for this visit:  Rash Questionnaire (Submitted on 1/28/2025)  Chief Complaint: Rash  nail changes: No

## 2025-01-30 NOTE — ASSESSMENT & PLAN NOTE
Encourage follow-up with POTS specialized center. Discussed supportive care and return parameters.

## 2025-02-04 ENCOUNTER — OFFICE VISIT (OUTPATIENT)
Age: 25
End: 2025-02-04
Payer: COMMERCIAL

## 2025-02-04 VITALS
SYSTOLIC BLOOD PRESSURE: 104 MMHG | DIASTOLIC BLOOD PRESSURE: 60 MMHG | HEART RATE: 101 BPM | BODY MASS INDEX: 20.2 KG/M2 | OXYGEN SATURATION: 98 % | WEIGHT: 114 LBS | TEMPERATURE: 98.6 F | HEIGHT: 63 IN

## 2025-02-04 DIAGNOSIS — J06.9 ACUTE UPPER RESPIRATORY INFECTION: Primary | ICD-10-CM

## 2025-02-04 PROCEDURE — 87070 CULTURE OTHR SPECIMN AEROBIC: CPT | Performed by: FAMILY MEDICINE

## 2025-02-04 PROCEDURE — 87636 SARSCOV2 & INF A&B AMP PRB: CPT | Performed by: FAMILY MEDICINE

## 2025-02-04 PROCEDURE — 99213 OFFICE O/P EST LOW 20 MIN: CPT | Performed by: FAMILY MEDICINE

## 2025-02-04 RX ORDER — AMOXICILLIN 500 MG/1
500 TABLET, FILM COATED ORAL 3 TIMES DAILY
Qty: 21 TABLET | Refills: 0 | Status: SHIPPED | OUTPATIENT
Start: 2025-02-04 | End: 2025-02-11

## 2025-02-04 RX ORDER — TRIAMCINOLONE ACETONIDE 1 MG/G
CREAM TOPICAL
COMMUNITY
Start: 2025-01-29

## 2025-02-05 ENCOUNTER — RESULTS FOLLOW-UP (OUTPATIENT)
Age: 25
End: 2025-02-05

## 2025-02-05 LAB
FLUAV RNA RESP QL NAA+PROBE: NEGATIVE
FLUBV RNA RESP QL NAA+PROBE: NEGATIVE
SARS-COV-2 RNA RESP QL NAA+PROBE: NEGATIVE

## 2025-02-06 LAB — BACTERIA THROAT CULT: NORMAL

## 2025-02-06 NOTE — ASSESSMENT & PLAN NOTE
Discussed supportive care and return parameters. Will add amoxil if not improving or worsening. Will check COVID / flu and throat culture.   Orders:    amoxicillin (AMOXIL) 500 MG tablet; Take 1 tablet (500 mg total) by mouth 3 (three) times a day for 7 days    Covid/Flu- Office Collect Normal    Throat culture

## 2025-02-06 NOTE — PROGRESS NOTES
Name: Colleen Alcaraz      : 2000      MRN: [id number]  Encounter Provider: Clive Garcia MD  Encounter Date: 2025   Encounter department: [facility]  :  Assessment & Plan  Acute upper respiratory infection  Discussed supportive care and return parameters. Will add amoxil if not improving or worsening. Will check COVID / flu and throat culture.   Orders:    amoxicillin (AMOXIL) 500 MG tablet; Take 1 tablet (500 mg total) by mouth 3 (three) times a day for 7 days    Covid/Flu- Office Collect Normal    Throat culture           History of Present Illness   Patient is a 22 y/o woman who presents c/o cough congestion sinus pressure no fevers chills nausea or vomiting.    Flu Symptoms  Associated symptoms include congestion, headaches, a sore throat, coughing and neck pain. Pertinent negatives include no ear discharge, ear pain, stridor, swollen glands, shortness of breath, abdominal pain, diarrhea or vomiting.   Sore Throat   This is a recurrent problem. The current episode started in the past 7 days. The problem has been gradually worsening. Neither side of throat is experiencing more pain than the other. There has been no fever. The fever has been present for Less than 1 day. The pain is at a severity of 5/10. The pain is moderate. Associated symptoms include congestion, coughing, headaches, a plugged ear sensation, neck pain and trouble swallowing. Pertinent negatives include no abdominal pain, diarrhea, drooling, ear discharge, ear pain, hoarse voice, shortness of breath, stridor, swollen glands or vomiting.     Review of Systems   Constitutional: Negative.    HENT:  Positive for congestion, sore throat and trouble swallowing. Negative for drooling, ear discharge, ear pain and hoarse voice.    Eyes: Negative.    Respiratory:  Positive for cough. Negative for shortness of breath and stridor.    Cardiovascular: Negative.    Gastrointestinal: Negative.  Negative for  "abdominal pain, diarrhea and vomiting.   Endocrine: Negative.    Genitourinary: Negative.    Musculoskeletal:  Positive for neck pain.   Allergic/Immunologic: Negative.    Neurological:  Positive for headaches.   Hematological: Negative.    Psychiatric/Behavioral: Negative.     All other systems reviewed and are negative.      Objective   /60 (BP Location: Right arm, Patient Position: Sitting, Cuff Size: Standard)   Pulse 101   Temp 98.6 °F (37 °C) (Temporal)   Ht 5' 3\" (1.6 m)   Wt 51.7 kg (114 lb)   LMP 01/21/2025 (Approximate)   SpO2 98%   BMI 20.19 kg/m²      Physical Exam  Constitutional:       General: She is not in acute distress.     Appearance: She is well-developed. She is not diaphoretic.   HENT:      Head: Normocephalic and atraumatic.      Right Ear: External ear normal.      Left Ear: External ear normal.      Nose: Nose normal.      Mouth/Throat:      Pharynx: No oropharyngeal exudate.   Eyes:      General:         Right eye: No discharge.         Left eye: No discharge.      Conjunctiva/sclera: Conjunctivae normal.      Pupils: Pupils are equal, round, and reactive to light.   Neck:      Thyroid: No thyromegaly.      Trachea: No tracheal deviation.   Cardiovascular:      Rate and Rhythm: Normal rate and regular rhythm.      Heart sounds: Normal heart sounds. No murmur heard.     No friction rub. No gallop.   Pulmonary:      Effort: Pulmonary effort is normal. No respiratory distress.      Breath sounds: Normal breath sounds.   Abdominal:      General: There is no distension.      Palpations: Abdomen is soft.      Tenderness: There is no abdominal tenderness. There is no guarding or rebound.   Musculoskeletal:         General: Normal range of motion.   Lymphadenopathy:      Cervical: No cervical adenopathy.   Skin:     General: Skin is warm.   Neurological:      Mental Status: She is alert and oriented to person, place, and time.      Cranial Nerves: No cranial nerve deficit. "   Psychiatric:         Behavior: Behavior normal.         Thought Content: Thought content normal.         Judgment: Judgment normal.

## 2025-02-17 ENCOUNTER — TELEPHONE (OUTPATIENT)
Age: 25
End: 2025-02-17

## 2025-02-17 NOTE — TELEPHONE ENCOUNTER
Spoke with patient who reports she is in her first pack of slynd.  She reports she is in the last row, but her period started on Friday.  She was advised to continue taking pills in order and go on to next pack after finishing the first one. She was advised she can have abnormal bleeding for the first few months.  Pt verbalized understanding, no further questions or concerns at this time.

## 2025-03-06 PROBLEM — J06.9 ACUTE UPPER RESPIRATORY INFECTION: Status: RESOLVED | Noted: 2025-02-04 | Resolved: 2025-03-06

## 2025-03-12 ENCOUNTER — TELEPHONE (OUTPATIENT)
Age: 25
End: 2025-03-12

## 2025-03-21 DIAGNOSIS — G90.9 AUTONOMIC DYSFUNCTION: ICD-10-CM

## 2025-03-21 DIAGNOSIS — G90.A POTS (POSTURAL ORTHOSTATIC TACHYCARDIA SYNDROME): Primary | ICD-10-CM

## 2025-03-31 ENCOUNTER — TELEPHONE (OUTPATIENT)
Age: 25
End: 2025-03-31

## 2025-03-31 DIAGNOSIS — G90.9 AUTONOMIC DYSFUNCTION: Primary | ICD-10-CM

## 2025-03-31 DIAGNOSIS — G90.A POTS (POSTURAL ORTHOSTATIC TACHYCARDIA SYNDROME): ICD-10-CM

## 2025-03-31 NOTE — TELEPHONE ENCOUNTER
Recd call from Katie, pt's mother requesting that a new referral be placed for pt to see Neurology at [facility]. Katie contacted [facility] and reviewed the referral copy that Katie has at home and was informed by the [facility] staff member that the office never received the fax yet, confirmed fax # with Katie.  Katie stated that referral needs to be updated for either Urgent or High Priority (per [facility] staff member).    Referral updated with request of ASAP as the priority. Send to Dr. Carlos to review and sign if agreeable.     Michelle: Please print the referral out and send via the fax machine in office to Dr. Carroll's office at [phone] and fax to Katie at [phone]. Thank you!

## 2025-03-31 NOTE — TELEPHONE ENCOUNTER
Patient's mother calling on behalf of patient (communication consent on file). Patient received bill for DOC 2/4/25, insurance states testing performed in office has been coded incorrectly. Patent had COVID and strep throat swabs done.  Please have practice admin review billing and reach out to patient's mother with further advice. She thanks us for our help!

## 2025-03-31 NOTE — TELEPHONE ENCOUNTER
Patient's mother Katie called back regarding message below. Katie asked for the fax below to be sent to    Attention: Rocío Alvarez, please see above message. Thank you!

## 2025-04-02 ENCOUNTER — TELEPHONE (OUTPATIENT)
Age: 25
End: 2025-04-02

## 2025-04-02 NOTE — TELEPHONE ENCOUNTER
Pt mother, Katie called on behalf of patient (stated that communication consent on file, but not finding uploaded in pt chart at this time)  I kindly requesting a return call today 4/2 from  as confirmation message was received re: pt billing statement, strep throat swab.  Can PCP office assist, provide advise on what can be done for insurance to reconsider paying?  Katie's phone# [phone].

## 2025-04-02 NOTE — TELEPHONE ENCOUNTER
Spoke to patient mother. I advise her we do not do these types of blood draws in the office and  [facility] would have designate where she should go.

## 2025-04-02 NOTE — TELEPHONE ENCOUNTER
"Katie, pt mother called on behalf of patient (states communication consent is on file, not finding in chart at this time?)    The patient is involved in a study, [facility], and they will be receiving a blood kit in the mail for genetic testing research, along with a letter that describes the type of genetic testing that will done.    Once they receive the \"blood kit\" in the mail, they need someone to draw the blood, then give the blood vials back to the patient.    The patient has a shipping label and needs to return the vials of blood to the [facility] in [facility] within 24 hours.    The pt need someone to draw the blood.  Can the PCP office assist with the blood draw?    Katie's phone# [phone].  "

## 2025-04-03 DIAGNOSIS — G90.A POTS (POSTURAL ORTHOSTATIC TACHYCARDIA SYNDROME): Primary | ICD-10-CM

## 2025-04-03 NOTE — TELEPHONE ENCOUNTER
Called the intake department at #[phone] and spoke with Gloria. Gloria confirmed that the urgent referral was received. It is attached to the pt's chart and was sent over as a stat referral to the clinic for case review at 12:27 pm. The direct number for the neurology department is # [phone].     Called pt's mother, Katie and made her aware of this. She was very appreciative.

## 2025-04-25 ENCOUNTER — EVALUATION (OUTPATIENT)
Dept: PHYSICAL THERAPY | Facility: CLINIC | Age: 25
End: 2025-04-25
Attending: FAMILY MEDICINE
Payer: COMMERCIAL

## 2025-04-25 DIAGNOSIS — R42 VERTIGO: ICD-10-CM

## 2025-04-25 DIAGNOSIS — H93.13 TINNITUS OF BOTH EARS: ICD-10-CM

## 2025-04-25 DIAGNOSIS — H91.93 BILATERAL HEARING LOSS, UNSPECIFIED HEARING LOSS TYPE: ICD-10-CM

## 2025-04-25 PROCEDURE — 97162 PT EVAL MOD COMPLEX 30 MIN: CPT | Performed by: PHYSICAL THERAPIST

## 2025-04-25 NOTE — PROGRESS NOTES
PT Evaluation     Today's date: 2025  Patient name: Colleen Alcaraz  : 2000  MRN: [id number]  Referring provider: Clive Garcia MD  Dx:   Encounter Diagnosis     ICD-10-CM    1. Vertigo  R42 Ambulatory Referral to Physical Therapy      2. Tinnitus of both ears  H93.13 Ambulatory Referral to Physical Therapy      3. Bilateral hearing loss, unspecified hearing loss type  H91.93 Ambulatory Referral to Physical Therapy                     Assessment/Plan    Subjective Evaluation    History of Present Illness  Mechanism of injury: Pt has h/o diagnosed as a teenager, hypoglycemia, visiospatial issues and POTS. Overall things are well controlled. She has difficulty with balance in wide open space. She also feels worse when she gets her period or she gets sick. She has had a reasonable baseline for the last few years. IN 2024 she got a cold then her symptoms all spiked and has been struggling with more symptoms since then. She has been getting a rash on her stomach when her dizziness has been worse. Allergist feels that it is due to overall systemic hypersensitivities. She has also now started having vertigo in which the room starts spinning for hours. If she lays on her side it feels better. She uses a chair in the shower due to frequently blacking out and almost fainting. She is unable to walk across big gricelda glots.       Objective           Precautions:       Manuals                                                                 Neuro Re-Ed                                                                                                        Ther Ex                                                                                                                     Ther Activity                                       Gait Training                                       Modalities

## 2025-04-28 ENCOUNTER — OFFICE VISIT (OUTPATIENT)
Dept: OBGYN CLINIC | Facility: MEDICAL CENTER | Age: 25
End: 2025-04-28
Payer: COMMERCIAL

## 2025-04-28 VITALS
SYSTOLIC BLOOD PRESSURE: 108 MMHG | DIASTOLIC BLOOD PRESSURE: 60 MMHG | HEIGHT: 63 IN | BODY MASS INDEX: 20.38 KG/M2 | WEIGHT: 115 LBS

## 2025-04-28 DIAGNOSIS — E28.2 PCOS (POLYCYSTIC OVARIAN SYNDROME): ICD-10-CM

## 2025-04-28 DIAGNOSIS — F32.81 PMDD (PREMENSTRUAL DYSPHORIC DISORDER): ICD-10-CM

## 2025-04-28 PROCEDURE — 99212 OFFICE O/P EST SF 10 MIN: CPT | Performed by: OBSTETRICS & GYNECOLOGY

## 2025-04-28 RX ORDER — LEVOCETIRIZINE DIHYDROCHLORIDE 5 MG/1
TABLET, FILM COATED ORAL
COMMUNITY
Start: 2025-04-22

## 2025-04-28 RX ORDER — FLUTICASONE PROPIONATE 50 MCG
SPRAY, SUSPENSION (ML) NASAL
COMMUNITY
Start: 2025-04-24

## 2025-04-30 ENCOUNTER — OFFICE VISIT (OUTPATIENT)
Dept: PHYSICAL THERAPY | Facility: CLINIC | Age: 25
End: 2025-04-30
Attending: FAMILY MEDICINE
Payer: COMMERCIAL

## 2025-04-30 DIAGNOSIS — R42 VERTIGO: Primary | ICD-10-CM

## 2025-04-30 DIAGNOSIS — H93.13 TINNITUS OF BOTH EARS: ICD-10-CM

## 2025-04-30 PROCEDURE — 97112 NEUROMUSCULAR REEDUCATION: CPT | Performed by: PHYSICAL THERAPIST

## 2025-04-30 NOTE — PROGRESS NOTES
Daily Note     Today's date: 2025  Patient name: Colleen Alcaraz  : 2000  MRN: [id number]  Referring provider: Clive Garcia MD  Dx: No diagnosis found.               Subjective: Patient report to therapy this date with no new changes since last session      Objective: See treatment diary below      Assessment: Had extensive discussion of exercise plan for home including mat level exercises for 10 minutes 3 days a week. She also had no increase in POTS symptoms when walking with weighted vest while on feet for about 10 minutes with slow pace. Suggested we may be able to find.      Plan: Continue per plan of care.      Short Term Goal Expiration Date:(10 visits - )  Long Term Goal Expiration Date: ()  POC Expiration Date: ()        POC expires Unit limit Auth Expiration date PT/OT/ST + Visit Limit?   25 bomn bomn bomn                           Visit/Unit Tracking  AUTH Status:  Date              na Used  2             Remaining                     Precautions: POTS, dizziness, hypoglycemia       Manuals                                        Neuro Re-Ed         EC ambulation Weighted vest and TB wrap on legs - 2 laps    Weighted vest - 2 laps    Weighted vest tightly wrapped -2 laps                                                       Ther Ex                                                                        Ther Activity                        Gait Training        Walking poles Parking lot with 1 walking pole                Modalities

## 2025-05-06 NOTE — PROGRESS NOTES
A/p     PCOS -    Pt is doing well   She reports that the cycles are normal and doing well     She likes the pills and agreeable to cont the use.     2.  PMDD    Her pills are making this ab it better

## 2025-05-07 ENCOUNTER — OFFICE VISIT (OUTPATIENT)
Dept: PHYSICAL THERAPY | Facility: CLINIC | Age: 25
End: 2025-05-07
Attending: FAMILY MEDICINE
Payer: COMMERCIAL

## 2025-05-07 DIAGNOSIS — H93.13 TINNITUS OF BOTH EARS: ICD-10-CM

## 2025-05-07 DIAGNOSIS — H91.93 BILATERAL HEARING LOSS, UNSPECIFIED HEARING LOSS TYPE: ICD-10-CM

## 2025-05-07 DIAGNOSIS — R42 VERTIGO: Primary | ICD-10-CM

## 2025-05-07 PROCEDURE — 97110 THERAPEUTIC EXERCISES: CPT | Performed by: PHYSICAL THERAPIST

## 2025-05-09 NOTE — PROGRESS NOTES
Daily Note     Today's date: 2025  Patient name: Colleen Alcaraz  : 2000  MRN: [id number]  Referring provider: Clive Garcia MD  Dx:   Encounter Diagnosis     ICD-10-CM    1. Vertigo  R42       2. Tinnitus of both ears  H93.13       3. Bilateral hearing loss, unspecified hearing loss type  H91.93                      Subjective: Is having a very bad day with dizziness. Trialed using abdominal binder which helped symptoms of dizziness and improved unsteadiness but caused her ribs to feel like they are dislocating. Was able to do 10 min of exercise 3 days a week      Objective: See treatment diary below      Assessment: Patient was able to tolerate exercises this date without significant increase in POTS symptoms. Overall patient noted to be moving and shaking trunk and legs with an explanation from her that it makes her better oriented to whre she is in space. Suggested getting larger abdominal binder to put less weight onto ribs and more distributed versus skinnier binder.       Plan: Continue per plan of care.      Short Term Goal Expiration Date:(10 visits - )  Long Term Goal Expiration Date: ()  POC Expiration Date: ()        POC expires Unit limit Auth Expiration date PT/OT/ST + Visit Limit?   25 bomn bomn bomn                           Visit/Unit Tracking  AUTH Status:  Date             na Used  2 3            Remaining                     Precautions: POTS, dizziness, hypoglycemia       Manuals                                       Neuro Re-Ed         EC ambulation Weighted vest and TB wrap on legs - 2 laps    Weighted vest - 2 laps    Weighted vest tightly wrapped -2 laps                                                       Ther Ex        bridges  Semi recumbent - 10sec x 5      SLR  10x2 ea with focus on TrA      SL hip abd  2x10 ea                                              Ther Activity                        Gait Training         Walking poles Parking lot with 1 walking pole                Modalities

## 2025-05-13 ENCOUNTER — OFFICE VISIT (OUTPATIENT)
Age: 25
End: 2025-05-13
Payer: COMMERCIAL

## 2025-05-13 VITALS
RESPIRATION RATE: 16 BRPM | BODY MASS INDEX: 20.52 KG/M2 | HEIGHT: 63 IN | OXYGEN SATURATION: 100 % | HEART RATE: 83 BPM | WEIGHT: 115.8 LBS | SYSTOLIC BLOOD PRESSURE: 116 MMHG | DIASTOLIC BLOOD PRESSURE: 78 MMHG | TEMPERATURE: 97.8 F

## 2025-05-13 DIAGNOSIS — J06.9 VIRAL URI WITH COUGH: Primary | ICD-10-CM

## 2025-05-13 PROCEDURE — 99213 OFFICE O/P EST LOW 20 MIN: CPT | Performed by: STUDENT IN AN ORGANIZED HEALTH CARE EDUCATION/TRAINING PROGRAM

## 2025-05-13 PROCEDURE — 87636 SARSCOV2 & INF A&B AMP PRB: CPT | Performed by: STUDENT IN AN ORGANIZED HEALTH CARE EDUCATION/TRAINING PROGRAM

## 2025-05-13 NOTE — PROGRESS NOTES
"Name: Colleen Alcaraz      : 2000      MRN: [id number]  Encounter Provider: Govind Alfaro MD  Encounter Date: 2025   Encounter department: [facility] PRIMARY CARE  :  Assessment & Plan  Viral URI with cough    Orders:  •  Covid/Flu- Office Collect Normal; Future  COVID/flu test ordered at today's visit, will follow-up results with patient.  Recommended supportive care, including rest, plenty of hydration, and over-the-counter medicine as needed.  Advised patient to follow-up with office if symptoms worsen or fail to improve.         History of Present Illness   URI   This is a new problem. The current episode started today. There has been no fever. Associated symptoms include congestion, coughing, headaches, joint pain, rhinorrhea, sneezing and a sore throat. Pertinent negatives include no abdominal pain, diarrhea, dysuria, nausea or vomiting. She has tried decongestant for the symptoms. The treatment provided no relief.     Review of Systems   HENT:  Positive for congestion, rhinorrhea, sneezing and sore throat.    Respiratory:  Positive for cough.    Gastrointestinal:  Negative for abdominal pain, diarrhea, nausea and vomiting.   Genitourinary:  Negative for dysuria.   Musculoskeletal:  Positive for joint pain.   Neurological:  Positive for headaches.       Objective   /78 (BP Location: Left arm, Patient Position: Sitting, Cuff Size: Standard)   Pulse 83   Temp 97.8 °F (36.6 °C) (Temporal)   Resp 16   Ht 5' 3\" (1.6 m)   Wt 52.5 kg (115 lb 12.8 oz)   LMP 2025 (Exact Date)   SpO2 100%   BMI 20.51 kg/m²      Physical Exam  Constitutional:       General: She is not in acute distress.     Appearance: Normal appearance. She is not ill-appearing.   HENT:      Head: Normocephalic and atraumatic.      Right Ear: Tympanic membrane and ear canal normal. There is no impacted cerumen.      Left Ear: Tympanic membrane and ear canal normal. There is no impacted cerumen.    "   Nose: Nose normal. No congestion.      Mouth/Throat:      Mouth: Mucous membranes are moist.      Pharynx: Oropharynx is clear. Posterior oropharyngeal erythema present. No oropharyngeal exudate.   Eyes:      General:         Right eye: No discharge.         Left eye: No discharge.      Conjunctiva/sclera: Conjunctivae normal.      Pupils: Pupils are equal, round, and reactive to light.   Cardiovascular:      Rate and Rhythm: Normal rate and regular rhythm.      Heart sounds: Normal heart sounds. No murmur heard.  Pulmonary:      Effort: Pulmonary effort is normal. No respiratory distress.      Breath sounds: Normal breath sounds.   Abdominal:      Palpations: Abdomen is soft.      Tenderness: There is no abdominal tenderness.   Musculoskeletal:      Right lower leg: No edema.      Left lower leg: No edema.   Neurological:      Mental Status: She is alert.

## 2025-05-14 ENCOUNTER — TELEPHONE (OUTPATIENT)
Age: 25
End: 2025-05-14

## 2025-05-14 DIAGNOSIS — J06.9 ACUTE UPPER RESPIRATORY INFECTION: Primary | ICD-10-CM

## 2025-05-14 NOTE — TELEPHONE ENCOUNTER
Patient called to check that status on if she will be needing an antibiotic at this time. Patient stated that she will be traveling on Friday and if antibiotics are needed she would like to be able to start them tonight. Patient stated her pharmacy closes at 7pm. Please advise what pcp would like her to do at this time and if antibiotics are recommended.

## 2025-05-14 NOTE — TELEPHONE ENCOUNTER
Patient called, state she is still having Flu like symptoms, questions if  Dominic Stewart or Dr. Mena have reviewed  Covid/Flu- Office Collect. Unable to locate provider lab result review, Patient request callback with  results. Please advise Patient at [phone],if any further questions.

## 2025-05-14 NOTE — TELEPHONE ENCOUNTER
Please review results looks as if they were negative/ confirm. Patient is still not feeling well do you want her to start an antibiotic?? Please place order if appropriate    Thank you

## 2025-05-14 NOTE — TELEPHONE ENCOUNTER
Patient calling for her COVID/Flu test she had done 5/13/25.  Informed patient test still in process/Pending.  Patient would like to know if an antibiotic can be called in for her current symptoms.  Body ache, chills, nasal congestion dark yellow.    Pharmacy [facility].

## 2025-05-20 RX ORDER — AMOXICILLIN 500 MG/1
500 TABLET, FILM COATED ORAL 3 TIMES DAILY
Qty: 21 TABLET | Refills: 0 | Status: SHIPPED | OUTPATIENT
Start: 2025-05-20 | End: 2025-05-27

## 2025-05-20 NOTE — TELEPHONE ENCOUNTER
Patient reports she is still not feeling better and would like a call back to advise if she can start antibiotics. She called last week as well.

## 2025-05-21 ENCOUNTER — OFFICE VISIT (OUTPATIENT)
Dept: PHYSICAL THERAPY | Facility: CLINIC | Age: 25
End: 2025-05-21
Attending: FAMILY MEDICINE
Payer: COMMERCIAL

## 2025-05-21 ENCOUNTER — TELEPHONE (OUTPATIENT)
Age: 25
End: 2025-05-21

## 2025-05-21 DIAGNOSIS — G90.A POTS (POSTURAL ORTHOSTATIC TACHYCARDIA SYNDROME): Primary | ICD-10-CM

## 2025-05-21 DIAGNOSIS — R42 VERTIGO: ICD-10-CM

## 2025-05-21 DIAGNOSIS — H93.13 TINNITUS OF BOTH EARS: ICD-10-CM

## 2025-05-21 PROCEDURE — 97110 THERAPEUTIC EXERCISES: CPT | Performed by: PHYSICAL THERAPIST

## 2025-05-21 PROCEDURE — 97150 GROUP THERAPEUTIC PROCEDURES: CPT | Performed by: PHYSICAL THERAPIST

## 2025-05-21 NOTE — PROGRESS NOTES
Daily Note     Today's date: 2025  Patient name: Colleen Alcaraz  : 2000  MRN: [id number]  Referring provider: Clive Garcia MD  Dx:   Encounter Diagnosis     ICD-10-CM    1. POTS (postural orthostatic tachycardia syndrome)  G90.A       2. Vertigo  R42       3. Tinnitus of both ears  H93.13                      Subjective: Patient reports overall being sick from infection with a fever and aches for the last week      Objective: See treatment diary below      Assessment: Completed supine strengthening exercises again this date as pt has not been feeling well. Added a few more minutes and activities which she was able to tolerate. Issued new TB.       Plan: Continue per plan of care.      Short Term Goal Expiration Date:(10 visits - )  Long Term Goal Expiration Date: ()  POC Expiration Date: ()        POC expires Unit limit Auth Expiration date PT/OT/ST + Visit Limit?   25 bomn bomn bomn                           Visit/Unit Tracking  AUTH Status:  Date             na Used  2 3            Remaining                     Precautions: POTS, dizziness, hypoglycemia       Manuals                                      Neuro Re-Ed         EC ambulation Weighted vest and TB wrap on legs - 2 laps    Weighted vest - 2 laps    Weighted vest tightly wrapped -2 laps                                                       Ther Ex        bridges  Semi recumbent - 10sec x 5 Semi recumbent - 10sec x 10     SLR  10x2 ea with focus on TrA 10x2 ea with focus on TrA     SL hip abd  2x10 ea 2x10 ea     No money   OTB - 10 xec hol x 2     Hip abd   Iso hold - 89x15lph with TrA                             Ther Activity                        Gait Training        Walking poles Parking lot with 1 walking pole                Modalities

## 2025-05-21 NOTE — TELEPHONE ENCOUNTER
Patients mother called to schedule consult with Dr Pierre. I advised her patient would need a referral to schedule and provided central fax number. Thank you.

## 2025-05-28 ENCOUNTER — OFFICE VISIT (OUTPATIENT)
Dept: PHYSICAL THERAPY | Facility: CLINIC | Age: 25
End: 2025-05-28
Attending: FAMILY MEDICINE
Payer: COMMERCIAL

## 2025-05-28 DIAGNOSIS — R42 VERTIGO: ICD-10-CM

## 2025-05-28 DIAGNOSIS — H93.13 TINNITUS OF BOTH EARS: ICD-10-CM

## 2025-05-28 DIAGNOSIS — G90.A POTS (POSTURAL ORTHOSTATIC TACHYCARDIA SYNDROME): Primary | ICD-10-CM

## 2025-05-28 PROCEDURE — 97112 NEUROMUSCULAR REEDUCATION: CPT | Performed by: PHYSICAL THERAPIST

## 2025-05-28 PROCEDURE — 97150 GROUP THERAPEUTIC PROCEDURES: CPT | Performed by: PHYSICAL THERAPIST

## 2025-05-30 ENCOUNTER — OFFICE VISIT (OUTPATIENT)
Age: 25
End: 2025-05-30
Payer: COMMERCIAL

## 2025-05-30 VITALS
HEART RATE: 70 BPM | TEMPERATURE: 98.7 F | RESPIRATION RATE: 16 BRPM | WEIGHT: 115 LBS | SYSTOLIC BLOOD PRESSURE: 104 MMHG | HEIGHT: 63 IN | DIASTOLIC BLOOD PRESSURE: 62 MMHG | OXYGEN SATURATION: 98 % | BODY MASS INDEX: 20.38 KG/M2

## 2025-05-30 DIAGNOSIS — G47.59 OTHER PARASOMNIA: ICD-10-CM

## 2025-05-30 DIAGNOSIS — G90.A POTS (POSTURAL ORTHOSTATIC TACHYCARDIA SYNDROME): ICD-10-CM

## 2025-05-30 DIAGNOSIS — Z00.00 ANNUAL PHYSICAL EXAM: Primary | ICD-10-CM

## 2025-05-30 DIAGNOSIS — D89.89 PANDAS (PEDIATRIC AUTOIMMUNE NEUROPSYCHIATRIC DISEASE ASSOCIATED WITH STREPTOCOCCAL INFECTION)  (HCC): ICD-10-CM

## 2025-05-30 DIAGNOSIS — H91.93 BILATERAL HEARING LOSS, UNSPECIFIED HEARING LOSS TYPE: ICD-10-CM

## 2025-05-30 DIAGNOSIS — B94.8 PANDAS (PEDIATRIC AUTOIMMUNE NEUROPSYCHIATRIC DISEASE ASSOCIATED WITH STREPTOCOCCAL INFECTION)  (HCC): ICD-10-CM

## 2025-05-30 DIAGNOSIS — R42 VERTIGO: ICD-10-CM

## 2025-05-30 DIAGNOSIS — J45.909 REACTIVE AIRWAY DISEASE WITHOUT COMPLICATION, UNSPECIFIED ASTHMA SEVERITY, UNSPECIFIED WHETHER PERSISTENT: ICD-10-CM

## 2025-05-30 DIAGNOSIS — E28.2 PCOS (POLYCYSTIC OVARIAN SYNDROME): ICD-10-CM

## 2025-05-30 DIAGNOSIS — J30.1 ALLERGIC RHINITIS DUE TO POLLEN, UNSPECIFIED SEASONALITY: ICD-10-CM

## 2025-05-30 DIAGNOSIS — T78.2XXD ANAPHYLAXIS, SUBSEQUENT ENCOUNTER: ICD-10-CM

## 2025-05-30 PROCEDURE — 99395 PREV VISIT EST AGE 18-39: CPT | Performed by: FAMILY MEDICINE

## 2025-05-30 PROCEDURE — 99214 OFFICE O/P EST MOD 30 MIN: CPT | Performed by: FAMILY MEDICINE

## 2025-05-30 NOTE — PROGRESS NOTES
Adult Annual Physical  Name: Colleen Alcaraz      : 2000      MRN: [id number]  Encounter Provider: Clive Garcia MD  Encounter Date: 2025   Encounter department: [facility] PRIMARY CARE  Assessment & Plan  Annual physical exam  Annual well visit. Discussed various safety and health maintenance issues including healthy diet like the Mediterranean diet, exercise, ample sleep, stress reduction, and healthy weight as tolerated. Discussed supportive care and return parameters.   Orders:    Ambulatory Referral to Sleep Medicine; Future    POTS (postural orthostatic tachycardia syndrome)  Refer to allergist, sleep med and rheumatology, continue current treatment and therapy. Discussed supportive care and return parameters.   Orders:    Ambulatory Referral to Rheumatology; Future    Ambulatory Referral to Allergy; Future    Ambulatory Referral to Sleep Medicine; Future    Reactive airway disease without complication, unspecified asthma severity, unspecified whether persistent  Refer to allergist, sleep med and rheumatology, continue current treatment and therapy. Discussed supportive care and return parameters.   Orders:    Ambulatory Referral to Rheumatology; Future    Ambulatory Referral to Allergy; Future    Ambulatory Referral to Sleep Medicine; Future    PCOS (polycystic ovarian syndrome)  Refer to allergist, sleep med and rheumatology, continue current treatment and therapy. Discussed supportive care and return parameters.   Orders:    Ambulatory Referral to Rheumatology; Future    Ambulatory Referral to Sleep Medicine; Future    Bilateral hearing loss, unspecified hearing loss type  Refer to allergist, sleep med and rheumatology, continue current treatment and therapy. Discussed supportive care and return parameters.   Orders:    Ambulatory Referral to Rheumatology; Future    Ambulatory Referral to Sleep Medicine; Future    Vertigo  Refer to allergist, sleep med and rheumatology,  continue current treatment and therapy. Discussed supportive care and return parameters.   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 Discussed supportive care and return parameters.   Orders:    Ambulatory Referral to Rheumatology; Future    Ambulatory Referral to Allergy; Future    Ambulatory Referral to Sleep Medicine; Future    Allergic rhinitis due to pollen, unspecified seasonality  Refer to allergist, sleep med and rheumatology, continue current treatment and therapy. Discussed supportive care and return parameters.   Orders:    Ambulatory Referral to Allergy; Future    Ambulatory Referral to Sleep Medicine; Future    Anaphylaxis, subsequent encounter  Refer to allergist, sleep med and rheumatology, continue current treatment and therapy. Discussed supportive care and return parameters.   Orders:    Ambulatory Referral to Allergy; Future    Ambulatory Referral to Sleep Medicine; Future    Other parasomnia  Refer to allergist, sleep med and rheumatology, continue current treatment and therapy. Discussed supportive care and return parameters.   Orders:    Ambulatory Referral to Sleep Medicine; Future        Preventive Screenings:  - Diabetes Screening: screening up-to-date  - Cholesterol Screening: screening up-to-date   - Hepatitis C screening: screening not indicated   - HIV screening: screening not indicated   - Cervical cancer screening: screening up-to-date   - Colon cancer screening: screening not indicated   - Lung cancer screening: screening not indicated     Immunizations:  - Immunizations due: Prevnar 20 and Tdap         History of Present Illness     Adult Annual Physical:  Patient presents for annual physical. Patient is a 24 y/o woman who presents  for follow-up on POTS, RAD, PCOS, hearing loss, vertigo, h/o PANDAS, allergic rhinitis with h/o anaphylaxis, parasomnias. Pt admits she continues with therapy. No fevers chills nausea or vomiting. Pt also here for annual well visit admits being active eats and sleeps well..     Diet and Physical Activity:  - Diet/Nutrition: well balanced diet, limited junk food and low carb diet. many food allergies, eat protein focused meals/snacks evedy 3 hours for reactive hypoglycemia  - Exercise: 3-4 times a week on average. currently working on exercise plan with physical therapist for POTS, vestibular, and ligamnet issues    Depression Screening:    - PHQ-9 Score: 14    General Health:  - Sleep: sleeps poorly, unrefreshing sleep and witnessed gasping. frequent night terrors (PTSD), recent gasping/pauses in breathing in sleep  - Hearing: normal hearing bilateral ears and tinnitus. occassional tinnitus, does not impact day to day life  - Vision: vision problems, most recent eye exam < 1 year ago and wears glasses and contacts. 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    /GYN Health:  - Follows with GYN: yes.   - Last menstrual cycle: 4/11/2025.   - History of STDs: no  - Contraception:. taking birth control pill for PCOS      Advanced Care Planning:  - Has an advanced directive?: no    - Has a durable medical POA?: no      Review of Systems   Constitutional: Negative.    HENT: Negative.     Eyes: Negative.    Respiratory: Negative.     Cardiovascular: Negative.    Gastrointestinal: Negative.    Endocrine: Negative.    Genitourinary: Negative.    Musculoskeletal: Negative.    Allergic/Immunologic: Negative.    Neurological: Negative.    Hematological: Negative.    Psychiatric/Behavioral: Negative.     All other systems reviewed and  "are negative.        Objective   /62 (BP Location: Left arm, Patient Position: Sitting, Cuff Size: Large)   Pulse 70   Temp 98.7 °F (37.1 °C) (Temporal)   Resp 16   Ht 5' 3\" (1.6 m)   Wt 52.2 kg (115 lb)   LMP 04/11/2025   SpO2 98%   BMI 20.37 kg/m²     Physical Exam  Constitutional:       General: She is not in acute distress.     Appearance: She is well-developed. She is not diaphoretic.   HENT:      Head: Normocephalic and atraumatic.      Right Ear: External ear normal.      Left Ear: External ear normal.      Nose: Nose normal.      Mouth/Throat:      Pharynx: No oropharyngeal exudate.     Eyes:      General:         Right eye: No discharge.         Left eye: No discharge.      Conjunctiva/sclera: Conjunctivae normal.      Pupils: Pupils are equal, round, and reactive to light.     Neck:      Thyroid: No thyromegaly.      Trachea: No tracheal deviation.     Cardiovascular:      Rate and Rhythm: Normal rate and regular rhythm.      Heart sounds: Normal heart sounds. No murmur heard.     No friction rub. No gallop.   Pulmonary:      Effort: Pulmonary effort is normal. No respiratory distress.      Breath sounds: Normal breath sounds.   Abdominal:      General: There is no distension.      Palpations: Abdomen is soft.      Tenderness: There is no abdominal tenderness. There is no guarding or rebound.     Musculoskeletal:         General: Normal range of motion.   Lymphadenopathy:      Cervical: No cervical adenopathy.     Skin:     General: Skin is warm.     Neurological:      Mental Status: She is alert and oriented to person, place, and time.      Cranial Nerves: No cranial nerve deficit.     Psychiatric:         Behavior: Behavior normal.         Thought Content: Thought content normal.         Judgment: Judgment normal.         "

## 2025-05-30 NOTE — PROGRESS NOTES
Daily Note     Today's date: 2025  Patient name: Colleen Alcaraz  : 2000  MRN: [id number]  Referring provider: Clive Garcia MD  Dx:   Encounter Diagnosis     ICD-10-CM    1. POTS (postural orthostatic tachycardia syndrome)  G90.A       2. Vertigo  R42       3. Tinnitus of both ears  H93.13                      Subjective: Patient presents to physical therapy this date with reports of not feeling well the last few days as far as her balance and feelings of disorientation with balance.       Objective: See treatment diary below      Assessment: Added EC standing to HEP in order to start reducing dependence on vision for balance orientation and to sharpen somatosensory system integration.       Plan: Continue per plan of care.      Short Term Goal Expiration Date:(10 visits - )  Long Term Goal Expiration Date: ()  POC Expiration Date: ()        POC expires Unit limit Auth Expiration date PT/OT/ST + Visit Limit?   25 bomn bomn bomn                           Visit/Unit Tracking  AUTH Status:  Date            na Used  2 3 5           Remaining                     Precautions: POTS, dizziness, hypoglycemia       Manuals                                     Neuro Re-Ed         EC ambulation Weighted vest and TB wrap on legs - 2 laps    Weighted vest - 2 laps    Weighted vest tightly wrapped -2 laps   LE wraps    Backwards laps HT - 8 laps total    HT fwd - 10 laps total    EC laps fwd/bkwd - 2 laps ea total    Bouncing on pball between laps for 1-2 min    EC    Standing - 4x without finger tip hold    4x with finger tip hold                                            Ther Ex        bridges  Semi recumbent - 10sec x 5 Semi recumbent - 10sec x 10     SLR  10x2 ea with focus on TrA 10x2 ea with focus on TrA     SL hip abd  2x10 ea 2x10 ea     No money   OTB - 10 xec hol x 2     Hip abd   Iso hold - [id number] with TrA                              Ther Activity                        Gait Training        Walking poles Parking lot with 1 walking pole                Modalities

## 2025-05-30 NOTE — PATIENT INSTRUCTIONS
"Patient Education     Routine physical for adults   The Basics   Written by the doctors and editors at [facility]   What is a physical? -- A physical is a routine visit, or \"check-up,\" with your doctor. You might also hear it called a \"wellness visit\" or \"preventive visit.\"  During each visit, the doctor will:   Ask about your physical and mental health   Ask about your habits, behaviors, and lifestyle   Do an exam   Give you vaccines if needed   Talk to you about any medicines you take   Give advice about your health   Answer your questions  Getting regular check-ups is an important part of taking care of your health. It can help your doctor find and treat any problems you have. But it's also important for preventing health problems.  A routine physical is different from a \"sick visit.\" A sick visit is when you see a doctor because of a health concern or problem. Since physicals are scheduled ahead of time, you can think about what you want to ask the doctor.  How often should I get a physical? -- It depends on your age and health. In general, for people age 21 years and older:   If you are younger than 50 years, you might be able to get a physical every 3 years.   If you are 50 years or older, your doctor might recommend a physical every year.  If you have an ongoing health condition, like diabetes or high blood pressure, your doctor will probably want to see you more often.  What happens during a physical? -- In general, each visit will include:   Physical exam - The doctor or nurse will check your height, weight, heart rate, and blood pressure. They will also look at your eyes and ears. They will ask about how you are feeling and whether you have any symptoms that bother you.   Medicines - It's a good idea to bring a list of all the medicines you take to each doctor visit. Your doctor will talk to you about your medicines and answer any questions. Tell them if you are having any side effects that bother you. You " "should also tell them if you are having trouble paying for any of your medicines.   Habits and behaviors - This includes:   Your diet   Your exercise habits   Whether you smoke, drink alcohol, or use drugs   Whether you are sexually active   Whether you feel safe at home  Your doctor will talk to you about things you can do to improve your health and lower your risk of health problems. They will also offer help and support. For example, if you want to quit smoking, they can give you advice and might prescribe medicines. If you want to improve your diet or get more physical activity, they can help you with this, too.   Lab tests, if needed - The tests you get will depend on your age and situation. For example, your doctor might want to check your:   Cholesterol   Blood sugar   Iron level   Vaccines - The recommended vaccines will depend on your age, health, and what vaccines you already had. Vaccines are very important because they can prevent certain serious or deadly infections.   Discussion of screening - \"Screening\" means checking for diseases or other health problems before they cause symptoms. Your doctor can recommend screening based on your age, risk, and preferences. This might include tests to check for:   Cancer, such as breast, prostate, cervical, ovarian, colorectal, prostate, lung, or skin cancer   Sexually transmitted infections, such as chlamydia and gonorrhea   Mental health conditions like depression and anxiety  Your doctor will talk to you about the different types of screening tests. They can help you decide which screenings to have. They can also explain what the results might mean.   Answering questions - The physical is a good time to ask the doctor or nurse questions about your health. If needed, they can refer you to other doctors or specialists, too.  Adults older than 65 years often need other care, too. As you get older, your doctor will talk to you about:   How to prevent falling at " home   Hearing or vision tests   Memory testing   How to take your medicines safely   Making sure that you have the help and support you need at home  All topics are updated as new evidence becomes available and our peer review process is complete.  This topic retrieved from [employer] on: May 02, 2024.  Topic 934446 Version 1.0  Release: 32.4.3 - C32.122  © 2024 UpToDate, Inc. and/or its affiliates. All rights reserved.  Consumer Information Use and Disclaimer   Disclaimer: This generalized information is a limited summary of diagnosis, treatment, and/or medication information. It is not meant to be comprehensive and should be used as a tool to help the user understand and/or assess potential diagnostic and treatment options. It does NOT include all information about conditions, treatments, medications, side effects, or risks that may apply to a specific patient. It is not intended to be medical advice or a substitute for the medical advice, diagnosis, or treatment of a health care provider based on the health care provider's examination and assessment of a patient's specific and unique circumstances. Patients must speak with a health care provider for complete information about their health, medical questions, and treatment options, including any risks or benefits regarding use of medications. This information does not endorse any treatments or medications as safe, effective, or approved for treating a specific patient. UpToDate, Inc. and its affiliates disclaim any warranty or liability relating to this information or the use thereof.The use of this information is governed by the Terms of Use, available at https://www.[employer].com/en/know/clinical-effectiveness-terms. 2024© UpToDate, Inc. and its affiliates and/or licensors. All rights reserved.  Copyright   © 2024 UpToDate, Inc. and/or its affiliates. All rights reserved.

## 2025-06-02 ENCOUNTER — TELEPHONE (OUTPATIENT)
Dept: LAB | Facility: HOSPITAL | Age: 25
End: 2025-06-02

## 2025-06-02 NOTE — ASSESSMENT & PLAN NOTE
Refer to allergist, sleep med and rheumatology, continue current treatment and therapy. Discussed supportive care and return parameters.   Orders:    Ambulatory Referral to Sleep Medicine; Future

## 2025-06-02 NOTE — ASSESSMENT & PLAN NOTE
Refer to allergist, sleep med and rheumatology, continue current treatment and therapy. Discussed supportive care and return parameters.   Orders:    Ambulatory Referral to Rheumatology; Future    Ambulatory Referral to Allergy; Future    Ambulatory Referral to Sleep Medicine; Future

## 2025-06-02 NOTE — ASSESSMENT & PLAN NOTE
Refer to allergist, sleep med and rheumatology, continue current treatment and therapy. Discussed supportive care and return parameters.   Orders:    Ambulatory Referral to Allergy; Future    Ambulatory Referral to Sleep Medicine; Future

## 2025-06-02 NOTE — TELEPHONE ENCOUNTER
6/2 - pt will call back if she can't get to the lab to get done - wanted sooner than we could get to her

## 2025-06-02 NOTE — ASSESSMENT & PLAN NOTE
Annual well visit. Discussed various safety and health maintenance issues including healthy diet like the Mediterranean diet, exercise, ample sleep, stress reduction, and healthy weight as tolerated. Discussed supportive care and return parameters.   Orders:    Ambulatory Referral to Sleep Medicine; Future

## 2025-06-02 NOTE — ASSESSMENT & PLAN NOTE
Refer to allergist, sleep med and rheumatology, continue current treatment and therapy. Discussed supportive care and return parameters.   Orders:    Ambulatory Referral to Rheumatology; Future    Ambulatory Referral to Sleep Medicine; Future

## 2025-06-04 ENCOUNTER — TRANSCRIBE ORDERS (OUTPATIENT)
Dept: LAB | Facility: CLINIC | Age: 25
End: 2025-06-04

## 2025-06-04 ENCOUNTER — APPOINTMENT (OUTPATIENT)
Dept: LAB | Facility: CLINIC | Age: 25
End: 2025-06-04
Payer: COMMERCIAL

## 2025-06-04 DIAGNOSIS — Z00.00 ROUTINE GENERAL MEDICAL EXAMINATION AT A HEALTH CARE FACILITY: Primary | ICD-10-CM

## 2025-06-04 PROCEDURE — 36415 COLL VENOUS BLD VENIPUNCTURE: CPT

## 2025-06-10 ENCOUNTER — TELEPHONE (OUTPATIENT)
Age: 25
End: 2025-06-10

## 2025-06-10 DIAGNOSIS — G47.59 OTHER PARASOMNIA: ICD-10-CM

## 2025-06-10 DIAGNOSIS — G90.A POTS (POSTURAL ORTHOSTATIC TACHYCARDIA SYNDROME): Primary | ICD-10-CM

## 2025-06-10 DIAGNOSIS — B94.8 PANDAS (PEDIATRIC AUTOIMMUNE NEUROPSYCHIATRIC DISEASE ASSOCIATED WITH STREPTOCOCCAL INFECTION)  (HCC): ICD-10-CM

## 2025-06-10 DIAGNOSIS — F43.10 PTSD (POST-TRAUMATIC STRESS DISORDER): ICD-10-CM

## 2025-06-10 DIAGNOSIS — F32.2 SEVERE MAJOR DEPRESSIVE DISORDER (HCC): ICD-10-CM

## 2025-06-10 DIAGNOSIS — E28.2 PCOS (POLYCYSTIC OVARIAN SYNDROME): ICD-10-CM

## 2025-06-10 DIAGNOSIS — D89.89 PANDAS (PEDIATRIC AUTOIMMUNE NEUROPSYCHIATRIC DISEASE ASSOCIATED WITH STREPTOCOCCAL INFECTION)  (HCC): ICD-10-CM

## 2025-06-11 ENCOUNTER — APPOINTMENT (OUTPATIENT)
Dept: PHYSICAL THERAPY | Facility: CLINIC | Age: 25
End: 2025-06-11
Attending: FAMILY MEDICINE
Payer: COMMERCIAL

## 2025-06-17 ENCOUNTER — OFFICE VISIT (OUTPATIENT)
Dept: PHYSICAL THERAPY | Facility: CLINIC | Age: 25
End: 2025-06-17
Attending: FAMILY MEDICINE
Payer: COMMERCIAL

## 2025-06-17 DIAGNOSIS — R42 VERTIGO: ICD-10-CM

## 2025-06-17 DIAGNOSIS — G90.A POTS (POSTURAL ORTHOSTATIC TACHYCARDIA SYNDROME): Primary | ICD-10-CM

## 2025-06-17 PROCEDURE — 97110 THERAPEUTIC EXERCISES: CPT | Performed by: PHYSICAL THERAPIST

## 2025-06-17 PROCEDURE — 97112 NEUROMUSCULAR REEDUCATION: CPT | Performed by: PHYSICAL THERAPIST

## 2025-06-17 NOTE — PROGRESS NOTES
Daily Note     Today's date: 2025  Patient name: Colleen Alcaraz  : 2000  MRN: [id number]  Referring provider: Clive Garcia MD  Dx:   Encounter Diagnosis     ICD-10-CM    1. POTS (postural orthostatic tachycardia syndrome)  G90.A       2. Vertigo  R42                      Subjective: Did daily balance holds with eyes closed, did strengthenign twice and cardio one time. Did not feel well the rest of the night after supine biking last time.       Objective: See treatment diary below      Assessment: Pt was able to hold balance with eyes closed better this date compared to last. Pt was able to complete semi recumbent biking on table with pedals without icnreasing symptoms.       Plan: Continue per plan of care.      Short Term Goal Expiration Date:(10 visits - )  Long Term Goal Expiration Date: ()  POC Expiration Date: ()        POC expires Unit limit Auth Expiration date PT/OT/ST + Visit Limit?   25 bomn bomn bomn                           Visit/Unit Tracking  AUTH Status:  Date            na Used  2 3 5           Remaining                     Precautions: POTS, dizziness, hypoglycemia       Manuals                                    Neuro Re-Ed         EC ambulation Weighted vest and TB wrap on legs - 2 laps    Weighted vest - 2 laps    Weighted vest tightly wrapped -2 laps   LE wraps    Backwards laps HT - 8 laps total    HT fwd - 10 laps total    EC laps fwd/bkwd - 2 laps ea total    Bouncing on pball between laps for 1-2 min    EC    Standing - 4x without finger tip hold    4x with finger tip hold Standing - 4x without finger tip hold    4x with finger tip hold                                           Ther Ex        bridges  Semi recumbent - 10sec x 5 Semi recumbent - 10sec x 10     SLR  10x2 ea with focus on TrA 10x2 ea with focus on TrA     SL hip abd  2x10 ea 2x10 ea     No money   OTB - 10 xec hol x 2     Hip abd    Iso hold - [id number] with TrA     bike     Semi recumbent on table with pedal bike - 18 minutes                   Ther Activity                        Gait Training        Walking poles Parking lot with 1 walking pole                Modalities

## 2025-06-20 ENCOUNTER — TELEPHONE (OUTPATIENT)
Age: 25
End: 2025-06-20

## 2025-06-20 NOTE — TELEPHONE ENCOUNTER
Called and spoke with the patient. She stated she will call back once she is no longer driving.       If available, offer the patient Dr. Moctezuma in July. She has new patient openings.

## 2025-06-24 ENCOUNTER — OFFICE VISIT (OUTPATIENT)
Dept: PHYSICAL THERAPY | Facility: CLINIC | Age: 25
End: 2025-06-24
Attending: FAMILY MEDICINE
Payer: COMMERCIAL

## 2025-06-24 DIAGNOSIS — G90.A POTS (POSTURAL ORTHOSTATIC TACHYCARDIA SYNDROME): Primary | ICD-10-CM

## 2025-06-24 DIAGNOSIS — R42 VERTIGO: ICD-10-CM

## 2025-06-24 PROCEDURE — 97110 THERAPEUTIC EXERCISES: CPT | Performed by: PHYSICAL THERAPIST

## 2025-06-24 PROCEDURE — 97112 NEUROMUSCULAR REEDUCATION: CPT | Performed by: PHYSICAL THERAPIST

## 2025-06-25 NOTE — PROGRESS NOTES
Daily Note     Today's date: 2025  Patient name: Colleen Alcaraz  : 2000  MRN: [id number]  Referring provider: Clive Garcia MD  Dx:   Encounter Diagnosis     ICD-10-CM    1. POTS (postural orthostatic tachycardia syndrome)  G90.A       2. Vertigo  R42                      Subjective: Patient reports doing exercises 2 days last week and 1 day of cardio walking in her apartment. She feels OK today. Felt ok after last session.       Objective: See treatment diary below      Assessment: Tcompleted semi recumbent biking this session with good success of 18 minutes total of exercise. Pt voiced that she felt Ok afterwards. Did complete static standing balance exercises which did cause her to feel more disoriented afterwards. Plan to RE next session.     Plan: Continue per plan of care.      Short Term Goal Expiration Date:(10 visits - )  Long Term Goal Expiration Date: ()  POC Expiration Date: ()        POC expires Unit limit Auth Expiration date PT/OT/ST + Visit Limit?   25 bomn bomn bomn                           Visit/Unit Tracking  AUTH Status:  Date            na Used  2 3 5           Remaining                     Precautions: POTS, dizziness, hypoglycemia       Manuals                                    Neuro Re-Ed         EC ambulation Weighted vest and TB wrap on legs - 2 laps    Weighted vest - 2 laps    Weighted vest tightly wrapped -2 laps   LE wraps    Backwards laps HT - 8 laps total    HT fwd - 10 laps total    EC laps fwd/bkwd - 2 laps ea total    Bouncing on pball between laps for 1-2 min    EC    Standing - 4x without finger tip hold    4x with finger tip hold Standing - 4x without finger tip hold    4x with finger tip hold                                           Ther Ex        bridges  Semi recumbent - 10sec x 5 Semi recumbent - 10sec x 10     SLR  10x2 ea with focus on TrA 10x2 ea with focus on TrA     SL hip abd   2x10 ea 2x10 ea     No money   OTB - 10 xec hol x 2     Hip abd   Iso hold - [id number] with TrA     bike     Semi recumbent on table with pedal bike - 18 minutes                   Ther Activity                        Gait Training        Walking poles Parking lot with 1 walking pole                Modalities

## 2025-06-27 NOTE — PROGRESS NOTES
Rheumatology Initial Outpatient Visit  Name: Colleen Alcaraz      : 2000      MRN: [id number]  Encounter Provider: Michelle Moctezuma MD  Encounter Date: 2025   Encounter department: [facility] [address]  :  Assessment & Plan  Hypermobility arthralgia  25-year-old female who presents for further evaluation of concern of joint hypermobility.  Patient demonstrates joint hypermobility with Beighton score 7 out of 9 on exam.  Discussed with patient that symptoms are suggestive of joint hypermobility. Some patients with joint hypermobility may have EDS but those who do not have EDS would still be considered to have joint hypermobility syndrome. Discussed there are various subtypes of EDS and genetic testing can be performed to evaluate for certain subtypes. Currently [facility] is not offering genetic testing - provided resources for facilities outside the system. hEDS is a clinical diagnosis best made by a specialist in EDS. Discussed in general treatment of joint hypermobility is supportive with focus on joint protection strategies. No Rheumatology follow-up is needed.           POTS (postural orthostatic tachycardia syndrome)    Orders:    Ambulatory Referral to Rheumatology    Reactive airway disease without complication, unspecified asthma severity, unspecified whether persistent    Orders:    Ambulatory Referral to Rheumatology    PCOS (polycystic ovarian syndrome)    Orders:    Ambulatory Referral to Rheumatology    Bilateral hearing loss, unspecified hearing loss type    Orders:    Ambulatory Referral to Rheumatology    Vertigo    Orders:    Ambulatory Referral to Rheumatology    PANDAS (pediatric autoimmune neuropsychiatric disease associated with streptococcal infection)  ([facility])    Orders:    Ambulatory Referral to Rheumatology      History of Present Illness   HPI    History of Present Illness  The patient is a 25-year-old female who presents for evaluation of joint  dislocations, hypermobility, and balance issues.    She has a history of POTS, PCOS, and blood sugar issues. She has been experiencing frequent joint dislocations, particularly in her patellas, which have occurred bilaterally. Despite never having fractured a bone, she has experienced multiple instances of hyperextension and generally feels hypermobile. Over the past few months, she has noticed an increase in joint cracking, particularly in her ankles and fingers, and has been experiencing more pain in her wrists, knees, and fingers. She does not feel like she is going to dislocate but occasionally experiences a sensation of instability in her knee during physical activity. She reports no family history of EDS or hypermobile joints but notes a prevalence of autoimmune conditions in her family, including lupus and autoimmune encephalitis in her brother. She has not required surgical intervention for her joint dislocations but typically undergoes physical therapy for several months following each episode. Multiple physical therapists have suggested the possibility of Frederic-Danlos syndrome (EDS), leading to her referral to rheumatology.    She has been undergoing physical therapy for balance issues and to establish an exercise routine for her POTS. She also reports issues with visual-spatial perception and relies heavily on her vision for navigation due to a lack of coordination between her vestibular system, vision, and proprioception. She is unable to stand or walk with her eyes closed without falling. She has undergone various inner ear tests and is currently in physical therapy to improve her balance.    She has a history of TMJ problems and had a palatal expander for many years, which was not very successful. Over a year ago, she had a complicated wisdom tooth extraction because her mouth was too small to accommodate wisdom teeth. They started growing into her sinuses, so they had to remove bone from her jaw to  "get around her teeth. She occasionally experiences poor wound healing, with some small cuts leaving scars and larger cuts healing irregularly. She has a fair amount of stretch marks, particularly on her hips and legs, despite no significant weight fluctuations. She also experiences migraines with aura.    FAMILY HISTORY  She reports a family history of autoimmune issues, including lupus and autoimmune encephalitis in her brother.    Review of Systems  Complete ROS conducted as per HPI.       Objective   /60 (BP Location: Left arm, Patient Position: Sitting, Cuff Size: Standard)   Pulse 72   Temp 97.8 °F (36.6 °C) (Tympanic)   Ht 5' 3\" (1.6 m)   Wt 52.1 kg (114 lb 12.8 oz)   SpO2 99%   BMI 20.34 kg/m²      Physical Exam  Physical Exam  Constitutional: well appearing, no acute distress  HEENT: normocephalic, sclera clear, no visible oral or nasal ulcers  Neck: supple, no palpable cervical adenopathy  CV: regular rate and rhythm, no murmur  Pulm: normal respiratory effort, lungs clear to auscultation b/l  Skin: no rashes, no skin thickening  Extremities: warm and well perfused, no edema  MSK:    BEIGHTON SCORE    LEFT RIGHT   1. Passive dosriflexion and hyperextension of the fifth MCP joint beyond 90° 1/1 1/1   2. Passive apposition of the thumb to the flexor aspect of the forearm 0/1 0/1   3. Passive hyperextension of the elbow beyond 10° 1/1 1/1   4. Passive hyperextension of the knee beyond 10° 1/1 1/1   5. Active forward flexion of the trunk with the knees fully extended so that the palms of the hands rest flat on the floor 1/1     TOTAL 7/9        Labs and Imaging  I have personally reviewed pertinent labs and imaging.   "

## 2025-06-30 ENCOUNTER — CONSULT (OUTPATIENT)
Age: 25
End: 2025-06-30
Payer: COMMERCIAL

## 2025-06-30 VITALS
HEIGHT: 63 IN | HEART RATE: 72 BPM | BODY MASS INDEX: 20.34 KG/M2 | DIASTOLIC BLOOD PRESSURE: 60 MMHG | WEIGHT: 114.8 LBS | OXYGEN SATURATION: 99 % | SYSTOLIC BLOOD PRESSURE: 104 MMHG | TEMPERATURE: 97.8 F

## 2025-06-30 DIAGNOSIS — J45.909 REACTIVE AIRWAY DISEASE WITHOUT COMPLICATION, UNSPECIFIED ASTHMA SEVERITY, UNSPECIFIED WHETHER PERSISTENT: ICD-10-CM

## 2025-06-30 DIAGNOSIS — D89.89 PANDAS (PEDIATRIC AUTOIMMUNE NEUROPSYCHIATRIC DISEASE ASSOCIATED WITH STREPTOCOCCAL INFECTION)  (HCC): ICD-10-CM

## 2025-06-30 DIAGNOSIS — R42 VERTIGO: ICD-10-CM

## 2025-06-30 DIAGNOSIS — M25.50 HYPERMOBILITY ARTHRALGIA: Primary | ICD-10-CM

## 2025-06-30 DIAGNOSIS — B94.8 PANDAS (PEDIATRIC AUTOIMMUNE NEUROPSYCHIATRIC DISEASE ASSOCIATED WITH STREPTOCOCCAL INFECTION)  (HCC): ICD-10-CM

## 2025-06-30 DIAGNOSIS — E28.2 PCOS (POLYCYSTIC OVARIAN SYNDROME): ICD-10-CM

## 2025-06-30 DIAGNOSIS — G90.A POTS (POSTURAL ORTHOSTATIC TACHYCARDIA SYNDROME): ICD-10-CM

## 2025-06-30 DIAGNOSIS — H91.93 BILATERAL HEARING LOSS, UNSPECIFIED HEARING LOSS TYPE: ICD-10-CM

## 2025-06-30 PROCEDURE — 99244 OFF/OP CNSLTJ NEW/EST MOD 40: CPT | Performed by: STUDENT IN AN ORGANIZED HEALTH CARE EDUCATION/TRAINING PROGRAM

## 2025-06-30 NOTE — PATIENT INSTRUCTIONS
I suspect symptoms are related to hypermobility. Although joint hypermobility can be associated with certain genetic causes such as EDS, some patients do not have the genetic mutations or full clinical classification criteria associated with EDS. These patients may still be categorized as having joint hypermobility syndrome (JHS). These patients have varying degrees of joint laxity with or without out instability or disability. They also often have joint pain affecting the hands, knees, and hips, may have headaches due to cervical spine hypermobility, or frequent ankle or wrist sprains. Chronic fatigue, pain amplification, and dysautonomia are frequently reported as well. Dysautonomia can manifest as POTS. Many patients also have symptoms of IBS, GERD, and chronic migraines.     Criteria to diagnose JHS include joint hypermobility, chronic joint pain, frequent dislocation/subluxation of joints, soft tissue lesions such as epicondylitis or bursitis, abnormal skin such as hyper extensibility, hernias, uterine/rectal prolapse, or varicose veins.    JHS has a familial predisposition and does overlap with EDS hypermobile type. At this time, the specific genetic defects responsible for JHS are unknown thus, genetic testing may not identify these people. Additionally, genes responsible for the hypermobility form of EDS are also not known at this time. Most genetic testing is for the other forms of EDS.     Recommendations for hypermobility spectrum disorder and EDSht:      Physical therapy tailored to the individual and may include assistive devices such as braces to improve joint stability; wheelchair or scooter to offload stress on lower-extremity joints or a suitable mattress to improve sleep quality.     In general, joint hyperextension and resistance/isometric exercise can exacerbate joint instability and pain.  Aoid high-impact activity that increases the risk of acute subluxation/dislocation, chronic pain, and  osteoarthritis. Low resistance exercise is recommended  to increase muscle tone for improved joint stability.   Chronic pain is common problem and pain medications, psychological and/or pain-oriented counseling may be required at times and should be tailored to symptoms.   Gastritis/reflux /delayed gastric emptying/irritable bowel syndrome may be present and should be treated with appropriate therapies.   Reduced bone density can be a problem earlier than normal in adult life and monitoring of calcium, vitamin D and continuation of  low-impact weight-bearing exercise to maximize bone density are recommended.      Additional lifestyle recommendations include:   Promote regular aerobic exercise   Promote fitness support with strengthening, gentle stretching and proprioception exercises   Promote postural and ergonomic hygiene, especially during school, sleep and workplace   Promote weight control (BMI <25)   Promote daily relaxation activities   Promote lubrication during sexual intercourse (women)    Promote assumption of generous isotonic liquid intake (2 -2.5 liters per day)   Promote assumption of high salt intake (except in case of arterial hypertension)   Promote early treatment of dental malocclusion   Avoid high impact sports/activities   Avoid low environmental temperatures   Avoid prolonged sitting positions and prolonged recumbency   Avoid sudden head-up postural changes   Avoid excessive weight lifting /carrying   Avoid large meals (especially of refined carbohydrates)   Avoid hard foods intake and excessive jaw movements (e.g. ice, gum, etc.)   Avoid bladder irritant foods intake  (e.g. coffee and citrus products)   Avoid nicotine and alcohol intake       Resources for Genetics Evaluation:     [facility]- Division of Human Genetics  [phone]    [facility] General Genetics  [phone]    [facility] General Genetics and Personalized Genomics  [phone]     Resource  for Provider Dr. Elyssa Rivera:  # [phone]  [address] [address]

## 2025-07-01 ENCOUNTER — OFFICE VISIT (OUTPATIENT)
Dept: PHYSICAL THERAPY | Facility: CLINIC | Age: 25
End: 2025-07-01
Attending: FAMILY MEDICINE
Payer: COMMERCIAL

## 2025-07-01 DIAGNOSIS — R42 VERTIGO: ICD-10-CM

## 2025-07-01 DIAGNOSIS — G90.A POTS (POSTURAL ORTHOSTATIC TACHYCARDIA SYNDROME): Primary | ICD-10-CM

## 2025-07-01 PROCEDURE — 97110 THERAPEUTIC EXERCISES: CPT | Performed by: PHYSICAL THERAPIST

## 2025-07-01 PROCEDURE — 97150 GROUP THERAPEUTIC PROCEDURES: CPT | Performed by: PHYSICAL THERAPIST

## 2025-07-01 NOTE — PROGRESS NOTES
Daily Note     Today's date: 2025  Patient name: Colleen Alcaraz  : 2000  MRN: [id number]  Referring provider: Clive Garcia MD  Dx:   Encounter Diagnosis     ICD-10-CM    1. POTS (postural orthostatic tachycardia syndrome)  G90.A       2. Vertigo  R42                      Subjective: Has not been feeling well the last few days.       Objective: See treatment diary below      Assessment: Patient felt disoriented after biking this session but not necessarily light headed like her ytpicaly POTS symptoms. Discussed importance of habituation and exposure for visual spatial symptoms and aerobic eercise for POTS symptoms. Continues to brain storm ways to get consistent aerobic conditioning accomplished.       Plan: Continue per plan of care.      Short Term Goal Expiration Date:(10 visits - )  Long Term Goal Expiration Date: ()  POC Expiration Date: ()        POC expires Unit limit Auth Expiration date PT/OT/ST + Visit Limit?   25 bomn bomn bomn                           Visit/Unit Tracking  AUTH Status:  Date            na Used  2 3 5           Remaining                     Precautions: POTS, dizziness, hypoglycemia       Manuals                                    Neuro Re-Ed         EC ambulation Weighted vest and TB wrap on legs - 2 laps    Weighted vest - 2 laps    Weighted vest tightly wrapped -2 laps   LE wraps    Backwards laps HT - 8 laps total    HT fwd - 10 laps total    EC laps fwd/bkwd - 2 laps ea total    Bouncing on pball between laps for 1-2 min    EC    Standing - 4x without finger tip hold    4x with finger tip hold Standing - 4x without finger tip hold    4x with finger tip hold                                           Ther Ex        bridges  Semi recumbent - 10sec x 5 Semi recumbent - 10sec x 10     SLR  10x2 ea with focus on TrA 10x2 ea with focus on TrA     SL hip abd  2x10 ea 2x10 ea     No money   OTB - 10 xec  hol x 2     Hip abd   Iso hold - [id number] with TrA     bike     Semi recumbent on table with pedal bike - 18 minutes                   Ther Activity                        Gait Training        Walking poles Parking lot with 1 walking pole                Modalities

## 2025-07-07 ENCOUNTER — TELEPHONE (OUTPATIENT)
Age: 25
End: 2025-07-07

## 2025-07-07 NOTE — TELEPHONE ENCOUNTER
Katie called and is requesting to have Colleen prakash as an EDS/HM/RAZO pt.     Please add to waitlist and contact to advise when we can add her to Dr. Rivera's schedule. Katie advised that she's struggled with the symptoms for the last 6 years but in the last 3 months she hasn't been able to drive/walk comfortable.    They are requesting a call back as soon as possible to advise of a realistic timeframe.

## 2025-07-08 ENCOUNTER — OFFICE VISIT (OUTPATIENT)
Dept: PHYSICAL THERAPY | Facility: CLINIC | Age: 25
End: 2025-07-08
Attending: FAMILY MEDICINE
Payer: COMMERCIAL

## 2025-07-08 DIAGNOSIS — R42 VERTIGO: ICD-10-CM

## 2025-07-08 DIAGNOSIS — G90.A POTS (POSTURAL ORTHOSTATIC TACHYCARDIA SYNDROME): Primary | ICD-10-CM

## 2025-07-08 PROCEDURE — 97110 THERAPEUTIC EXERCISES: CPT | Performed by: PHYSICAL THERAPIST

## 2025-07-08 PROCEDURE — 97112 NEUROMUSCULAR REEDUCATION: CPT | Performed by: PHYSICAL THERAPIST

## 2025-07-11 NOTE — PROGRESS NOTES
Daily Note     Today's date: 2025  Patient name: Colleen Alcaraz  : 2000  MRN: [id number]  Referring provider: Clive Garcia MD  Dx:   Encounter Diagnosis     ICD-10-CM    1. POTS (postural orthostatic tachycardia syndrome)  G90.A       2. Vertigo  R42                      Subjective: Patient reports no new changes since last session. Continues to have symptoms frequently. Has not passd out in the last month.       Objective: See treatment diary below      Assessment: Had long discussion about where ahd how she can continue to do consistent aerobic exercise when not in therapy. Discussed a variety of options and strategies. She will attempt rowing machine at home with only LE over hte next week. Therapist will discuss with multiple sites if she can use recumbent bike as fitness program member several days a week after work. Pt agreeable to plan and voiced understanding of importance of engagement in aerobic program.       Plan: Continue per plan of care.      Short Term Goal Expiration Date:(10 visits - )  Long Term Goal Expiration Date: ()  POC Expiration Date: ()        POC expires Unit limit Auth Expiration date PT/OT/ST + Visit Limit?   25 bomn bomn bomn                           Visit/Unit Tracking  AUTH Status:  Date            na Used  2 3 5           Remaining                     Precautions: POTS, dizziness, hypoglycemia       Manuals                                    Neuro Re-Ed         EC ambulation    LE wraps    Backwards laps HT - 8 laps total    HT fwd - 10 laps total    EC laps fwd/bkwd - 2 laps ea total    Bouncing on pball between laps for 1-2 min    EC    Standing - 4x without finger tip hold    4x with finger tip hold Standing - 4x without finger tip hold    4x with finger tip hold                                           Ther Ex        bridges   Semi recumbent - 10sec x 10     SLR   10x2 ea with focus on TrA      SL hip abd   2x10 ea     No money   OTB - 10 xec hol x 2     Hip abd   Iso hold - [id number] with TrA     bike Semi recumbent on table with pedal bike - 18 minutes    Semi recumbent on table with pedal bike - 18 minutes                   Ther Activity                        Gait Training        Walking poles                Modalities

## 2025-07-16 ENCOUNTER — OFFICE VISIT (OUTPATIENT)
Dept: PHYSICAL THERAPY | Facility: CLINIC | Age: 25
End: 2025-07-16
Attending: FAMILY MEDICINE
Payer: COMMERCIAL

## 2025-07-16 DIAGNOSIS — R42 VERTIGO: ICD-10-CM

## 2025-07-16 DIAGNOSIS — G90.A POTS (POSTURAL ORTHOSTATIC TACHYCARDIA SYNDROME): Primary | ICD-10-CM

## 2025-07-16 PROCEDURE — 97112 NEUROMUSCULAR REEDUCATION: CPT | Performed by: PHYSICAL THERAPIST

## 2025-07-17 NOTE — PROGRESS NOTES
Daily Note     Today's date: 2025  Patient name: Colleen Alcaraz  : 2000  MRN: [id number]  Referring provider: Clive Garcia MD  Dx:   Encounter Diagnosis     ICD-10-CM    1. POTS (postural orthostatic tachycardia syndrome)  G90.A       2. Vertigo  R42                      Subjective: Feeling very poorly started on her drive over. She states that it feels like she might pass out and then it passes but she isn't sure if its blood sugar, pots or just spatial symptoms.       Objective: See treatment diary below      Assessment: Patient was unable to tolerate actiity without having increase in chest pain this date. She felt better after rest and drinking water. Therapist declined aerobic training today. Patient requested to complete balance exercises with proprioceptive input which did not help to relieve her symptoms. Ended session eary due to extreme symptoms.       Plan: Continue per plan of care.      Short Term Goal Expiration Date:(10 visits - )  Long Term Goal Expiration Date: ()  POC Expiration Date: ()        POC expires Unit limit Auth Expiration date PT/OT/ST + Visit Limit?   25 bomn bomn bomn                           Visit/Unit Tracking  AUTH Status:  Date           na Used  2 3 5           Remaining                     Precautions: POTS, dizziness, hypoglycemia       Manuals                                    Neuro Re-Ed         EC ambulation  LE wraps, aw and weighted vest    Backwards laps HT - 8 laps total    HT fwd - 10 laps total    Tadem walkingfwd/bkwd - 2 laps ea total    Backwards without HT - 4 laps     Bouncing on pball between laps for 1-2 min  LE wraps    Backwards laps HT - 8 laps total    HT fwd - 10 laps total    EC laps fwd/bkwd - 2 laps ea total    Bouncing on pball between laps for 1-2 min    EC    Standing - 4x without finger tip hold    4x with finger tip hold Standing - 4x without finger tip  hold    4x with finger tip hold                                           Ther Ex        bridges   Semi recumbent - 10sec x 10     SLR   10x2 ea with focus on TrA     SL hip abd   2x10 ea     No money   OTB - 10 xec hol x 2     Hip abd   Iso hold - [id number] with TrA     bike Semi recumbent on table with pedal bike - 18 minutes    Semi recumbent on table with pedal bike - 18 minutes                   Ther Activity                        Gait Training        Walking poles                Modalities

## 2025-07-22 ENCOUNTER — OFFICE VISIT (OUTPATIENT)
Dept: PHYSICAL THERAPY | Facility: CLINIC | Age: 25
End: 2025-07-22
Attending: FAMILY MEDICINE
Payer: COMMERCIAL

## 2025-07-22 DIAGNOSIS — G90.A POTS (POSTURAL ORTHOSTATIC TACHYCARDIA SYNDROME): Primary | ICD-10-CM

## 2025-07-22 DIAGNOSIS — R42 VERTIGO: ICD-10-CM

## 2025-07-28 NOTE — TELEPHONE ENCOUNTER
Katie called because she stated someone would reach out to her to schedule with Dr. Rivera over 2 weeks ago but never called her back.    Did advise mother wait list is over 160 pt waiting and scheduling wont be possibly for about a year or so.       Spoke with Alecia in office and she stated she will reach out to Katie on 07/29/2025 to discuss the wait list process. Thank you

## 2025-07-29 ENCOUNTER — OFFICE VISIT (OUTPATIENT)
Dept: PHYSICAL THERAPY | Facility: CLINIC | Age: 25
End: 2025-07-29
Attending: FAMILY MEDICINE
Payer: COMMERCIAL

## 2025-07-29 DIAGNOSIS — R42 VERTIGO: ICD-10-CM

## 2025-07-29 DIAGNOSIS — G90.A POTS (POSTURAL ORTHOSTATIC TACHYCARDIA SYNDROME): Primary | ICD-10-CM

## 2025-07-29 PROCEDURE — 97110 THERAPEUTIC EXERCISES: CPT | Performed by: PHYSICAL THERAPIST

## 2025-08-04 ENCOUNTER — PATIENT MESSAGE (OUTPATIENT)
Dept: OBGYN CLINIC | Facility: MEDICAL CENTER | Age: 25
End: 2025-08-04

## 2025-08-12 ENCOUNTER — OFFICE VISIT (OUTPATIENT)
Dept: PHYSICAL THERAPY | Facility: CLINIC | Age: 25
End: 2025-08-12
Attending: FAMILY MEDICINE
Payer: COMMERCIAL